# Patient Record
Sex: MALE | Race: WHITE | HISPANIC OR LATINO | Employment: FULL TIME | URBAN - METROPOLITAN AREA
[De-identification: names, ages, dates, MRNs, and addresses within clinical notes are randomized per-mention and may not be internally consistent; named-entity substitution may affect disease eponyms.]

---

## 2017-08-27 ENCOUNTER — HOSPITAL ENCOUNTER (EMERGENCY)
Facility: CLINIC | Age: 32
Discharge: HOME | End: 2017-08-27
Attending: EMERGENCY MEDICINE | Admitting: EMERGENCY MEDICINE

## 2017-08-27 VITALS
SYSTOLIC BLOOD PRESSURE: 151 MMHG | DIASTOLIC BLOOD PRESSURE: 112 MMHG | OXYGEN SATURATION: 99 % | TEMPERATURE: 98.2 F | RESPIRATION RATE: 16 BRPM | HEART RATE: 83 BPM

## 2017-08-27 DIAGNOSIS — K04.7 DENTAL ABSCESS: ICD-10-CM

## 2017-08-27 PROCEDURE — 25000132 ZZH RX MED GY IP 250 OP 250 PS 637: Performed by: EMERGENCY MEDICINE

## 2017-08-27 PROCEDURE — 99283 EMERGENCY DEPT VISIT LOW MDM: CPT

## 2017-08-27 RX ORDER — OXYCODONE AND ACETAMINOPHEN 5; 325 MG/1; MG/1
2 TABLET ORAL ONCE
Status: COMPLETED | OUTPATIENT
Start: 2017-08-27 | End: 2017-08-27

## 2017-08-27 RX ORDER — PENICILLIN V POTASSIUM 500 MG/1
500 TABLET, FILM COATED ORAL 4 TIMES DAILY
Qty: 28 TABLET | Refills: 0 | Status: SHIPPED | OUTPATIENT
Start: 2017-08-27 | End: 2017-09-03

## 2017-08-27 RX ORDER — HYDROCODONE BITARTRATE AND ACETAMINOPHEN 5; 325 MG/1; MG/1
1-2 TABLET ORAL EVERY 4 HOURS PRN
Qty: 10 TABLET | Refills: 0 | Status: SHIPPED | OUTPATIENT
Start: 2017-08-27 | End: 2018-05-30

## 2017-08-27 RX ADMIN — OXYCODONE HYDROCHLORIDE AND ACETAMINOPHEN 2 TABLET: 5; 325 TABLET ORAL at 14:04

## 2017-08-27 ASSESSMENT — ENCOUNTER SYMPTOMS
FEVER: 0
FACIAL SWELLING: 1

## 2017-08-27 NOTE — ED AVS SNAPSHOT
[facility]    [address]    [facility] [address]    Phone:  [phone]    Fax:  [phone]                                       Ayan Camacho   MRN: [id number]    Department:  [facility]   Date of Visit:  8/27/2017           After Visit Summary Signature Page     I have received my discharge instructions, and my questions have been answered. I have discussed any challenges I see with this plan with the nurse or doctor.    ..........................................................................................................................................  Patient/Patient Representative Signature      ..........................................................................................................................................  Patient Representative Print Name and Relationship to Patient    ..................................................               ................................................  Date                                            Time    ..........................................................................................................................................  Reviewed by Signature/Title    ...................................................              ..............................................  Date                                                            Time

## 2017-08-27 NOTE — ED AVS SNAPSHOT
[facility]    [address]    [facility] [address]    Phone:  [phone]    Fax:  [phone]                                       Ayan Camacho   MRN: [id number]    Department:  [facility]   Date of Visit:  8/27/2017           Patient Information     Date Of Birth          1985        Your diagnoses for this visit were:     Dental abscess        You were seen by Ame Larson MD.      Follow-up Information     Follow up with Dentist  Today.    Why:  ASAP        Follow up with [facility].    Specialty:  EMERGENCY MEDICINE    Why:  As needed    Contact information:    [address]  [facility] [address] [phone]        Discharge Instructions         Discharge Instructions  Dental Pain    You have been seen today for a toothache. Your pain may be caused by an exposed nerve, an infection (pulpitis), a root abscess, or other problems. You will need to see a dentist for a solution to your tooth problem. Emergency Department care is only to help control your problem until you can see a dentist.  Today, we did not find any sign that your toothache was caused by a serious condition, but sometimes symptoms develop over time and cannot be found during an emergency visit, so it is very important that you follow up with your dentist.      Return to the Emergency Department if:    You develop a fever over 101 degrees Fahrenheit    You can t open your mouth normally, can t move your tongue well, or can t swallow    You have new or increased swelling of your face or neck.    You develop drainage of pus or foul smelling material from around your tooth.  What can I do to help myself?    Take any antibiotic the doctor may have prescribed for you today.    Avoid very hot or very cold foods as both can cause pain.    Make an appointment to see a dentist as soon as possible. If  you wish, we can provide you with a list of low-cost dental clinics.       Remember that you can always come back to the Emergency Department if you are not able to see your regular doctor in the amount of time listed above, if you get any new symptoms, or if there is anything that worries you.        Dental Resources  Name/Address/Phone Eligibility Hours Fee   [facility]  [address]  [place] [address]  [phone] Anyone Call for appointment [facility]  Medical Assistance  Private Insurance   [facility]  [address] [place] [address]  [phone] Anyone Call for appointment    [facility] refers to low-cost dental clinics for non-preventive care    Hungarian Interpreters available Prices start at   Adults        Cleaning $36-$160        X-Ray $20-40  Children        Cleaning $15        X-ray $10-20        Fluoride $10  Accepts cash, check or credit;  Does not take insurance or MA.   [facility]  [address] [place]  [address]  [phone] Anyone Afternoons and evenings    September-May    Answers phones after 10 AM $30.00 per visit   ($15.00 per visit if 62 or older)   Preventive care.  Restoration care; sliding fee; MA   [facility]  [address] [place] [address] [phone] Children birth to age 18 and pregnant women    [facility] Residents without insurance will be asked to apply for Assured Care. M TH F 8:30 am - 5 pm  T W 8:30 am - 7 pm    30 locations metro wide    Call for appointment and to confirm hours. Sliding Fee  [facility]  Medical Assistance  Assured Access  Private Insurance    8 Languages Spoken   [facility] [address] [address]  [phone] Anyone Call for appointment Sliding Fee  Accept insurance, [place],   [place] and self-pay.  Call if no insurance.    All services provided.  Staff fluent in Hmong, Laotian, Nauruan,  Bangladeshi, Greek, Sierra Leonean, and Farsi.   [facility]  [address] [place] [address]  [phone] Children  Adults in a walk in basis Mon - Fri. 8 - 5 pm       (closed 1-2 pm)  General Dentists & Hygienists  Private Dentists  Dentures Fees based on family size and income ranging from 40% - 100% payment by patient.   [facility]  [address] [place]  [address]    [phone] Anyone Mon - Fri 7:30 am - 5:00 pm  By Appt.    Tues & Wed @ 3:00 call for urgent care Appt for next day service Sliding fee:  MA; Insurance   [facility]  [address] [place], [place]  [address] [phone] Anyone Call for an appointment.  Days open vary every semester. Adult cleaning $25  Child cleaning $15  X-Rays $10-$15  Whitening services available  $75, includes cleaning  Seniors 50% off   [facility]  [address] [place]  [address]  [phone] Anyone M-F 8 am - 5 pm Most insurances accepted.  MA and Sliding Scale.   [facility]  [address] [place] [address]  [phone] Anyone without insurance Call to make appt   M-F 9:00 am - 5 pm   (Closed noon hour 12-1)    6 pm- 8 pm Evening hours also available for care Sliding fee based on income and size of household.   [facility]  [address] [place] [address]  [address]  [phone] press option 1    For the [facility] press option 4 Anyone              Anyone Monday  4 - 6:30 pm  Tuesday 12:30 - 3 & 4-6:30  Thursday 8:30 - 11 am & 12-2:30 pm  September through May only    All year around on Thursdays from 5-9 pm (only time a dentist is in.) Cleanings & X-Rays Only  Cleanings:  Adults $30                   Kids $20  X-Rays:  Adults $34                Kids $10    MA and Sliding fee   [address]  [address]    [phone] Anyone    (Hmong interpreters available) M-F 8:00 am - 5:00 pm       By appointment only  (same day appointments available) Sliding fee ($40+ may be due at appointment, remainder billed); MA; Insurance   [facility]  [address] [place] [address]    [phone]   Anyone    (Hmong interpreters available) M-Th 8:00 am - 8:00 pm  F 8:00 am - 5:00 pm    By appointment only  (same day appointments available) Sliding fee ($40+ may be due at appointment, remainder billed); MA; Insurance   [facility]  [address] [place]  [address] [phone] Anyone    Must live within [facility] to qualify for sliding fee services Mon, Tues, Thurs, Fri  8:30 am - 5:00 pm  Wed. 8:30 am - 7:00 pm  All other services by appt. only Sliding Fee; MA   Offer payment plans    Have financial workers that will assist with [place] and will use sliding fee for those who do not qualify.      [facility]  [address] [place] [phone] Anyone without insurance     Hours and day of week vary  (Call ahead for hours)    Walk-in only Free Services    Cleanings; Fillings; Extractions   [facility]  [address] [place] [address]  [phone] Anyone Call for an appointment Accept patients with [place] and Medical Assistance.  10% discount if bill is paid in full on day of service.  No sliding fee scale.     [facility]  [address] [place], [place] [phone] Anyone M-F  8 am-5 pm  Call for appt.    Walk-in hours 8 am - 11am and 1 pm - 5 pm, however take scheduled appointments first    No emergency services or oral surgeries Sliding Fee available with an [place] or [place] denial letter and proof of income.    Accepts Assured Access card and [place] coverage.     Name/Address/Phone Eligibility Hours Fee   [address] [place]  [address]  [phone] Anyone with emergencies only M & W clinic begins at 6 pm   Call ahead    Alternate Fridays for children by Appt only Free   [facility]  [address] [place] [address]  [phone]    Emergencies (adults only):  [phone] Anyone Free walk-in screens for oral surgery    Call ahead for hours    All other services by appt. only  Accepts MA for pediatrics only    Rates are about 25% - 40% less than private dental office.    No sliding fee scale   [facility]  [address] [place] [address]  [address]  [phone] Anyone as long as they do not have health insurance Hours on Mondays, Tuesdays, and Thursdays Sliding fees based on monthly income    No root canals, tooth pulls or emergencies   [facility]  [address] [place] [address]  [phone] Anyone  M - F 8:00 am - 5:00 pm  Wed 8:00 am - 8 pm Sliding fees; MA; Insurance   [facility]  [address] [place] [address]  [address]  [phone] Anyone age 55+ M - F 8:00 am - 4:30 pm    Appt. only Set slightly lower fees;  MA; Insurance         Give Kids a smile day in [facility] Children Takes place in February at a few locations                          Dental Pain:      Dear Emergency Department Patient:     Here at [facility], we are always pleased to evaluate you for emergency conditions and offer a screening examination. Today, we have seen you and determined that you have dental pain and/or a dental problem.  We do not have the equipment and/or advanced training to perform definitive dental care, therefore, you need to be seen by a dentist for further care.     You may see your regular dentist if you have one, or we have attached a list of community dental providers, including some who provide care at a reduced fee.      Please be aware that if a narcotic medication was prescribed, it will not be refilled in the emergency  department.  Accordingly, if you should have ongoing problems and/or pain, you should contact a dentist right away for definitive treatment.    Sincerely,        [facility])        Opioid Medication Discharge Instructions    You have been given a prescription for an opioid (narcotic) pain medicine and/or have   received a pain medicine while here in the emergency department. These medicines can make you drowsy or impaired.     You must not drive, operate dangerous equipment, or   engage in any other dangerous activities while taking these medications. If you drive while taking these medications, you could be arrested for DUI, or driving under the   influence. Do not drink any alcohol while you are taking these medications.     Opioid pain medications can cause addiction. If you have a history of chemical   dependency of any type, you are at a higher risk of becoming addicted to pain   medications. Only take these prescribed medications to treat your pain when all other   options have been tried. Take it for as short a time and as few doses as possible.     Store your pain pills in a secure place, as they are frequently stolen and provide a dangerous opportunity for children or visitors in your house to start abusing these powerful medications. We will not replace any lost or stolen medicine.     As soon as your pain is better, you should safely dispose of all your remaining medication.     Many prescription pain medications contain Tylenol (acetaminophen), including Vicodin, Tylenol #3, Norco, Lortab, and Percocet. You should not take any extra pills of Tylenol if you are using these prescription medications or you can get very sick. Do not ever take more than 4000 mg of acetaminophen in any 24 hour period.    All opioids tend to cause constipation. Drink plenty of water and eat foods that have   a lot of fiber, such as fruits, vegetables, prune juice, apple juice  and high fiber cereal.   Take a laxative if you don t move your bowels at least every other day. Miralax, Milk of   Magnesia, Colace, or Senna can be used to keep you regular.          24 Hour Appointment Hotline       To make an appointment at any [facility] clinic, call [phone] ([phone]). If you don't have a family doctor or clinic, we will help you find one. [facility] clinics are conveniently located to serve the needs of you and your family.             Review of your medicines      START taking        Dose / Directions Last dose taken    penicillin V potassium 500 MG tablet   Commonly known as:  VEETID   Dose:  500 mg   Quantity:  28 tablet        Take 1 tablet (500 mg) by mouth 4 times daily for 7 days   Refills:  0          Our records show that you are taking the medicines listed below. If these are incorrect, please call your family doctor or clinic.        Dose / Directions Last dose taken    NO ACTIVE MEDICATIONS        Refills:  0        oxyCODONE-acetaminophen 5-325 MG per tablet   Commonly known as:  PERCOCET   Dose:  1 tablet   Quantity:  10 tablet        Take 1 tablet by mouth every 6 hours as needed for pain   Refills:  0          ASK your doctor about these medications        Dose / Directions Last dose taken    * HYDROcodone-acetaminophen 5-325 MG per tablet   Commonly known as:  NORCO   Dose:  1-2 tablet   What changed:  Another medication with the same name was added. Make sure you understand how and when to take each.   Quantity:  15 tablet   Ask about: Which instructions should I use?        Take 1-2 tablets by mouth every 4 hours as needed for pain.   Refills:  0        * HYDROcodone-acetaminophen 5-325 MG per tablet   Commonly known as:  NORCO   Dose:  1-2 tablet   What changed:  You were already taking a medication with the same name, and this prescription was added. Make sure you understand how and when to take each.   Quantity:  10 tablet   Ask about: Which instructions should  I use?        Take 1-2 tablets by mouth every 4 hours as needed for moderate to severe pain   Refills:  0        * Notice:  This list has 2 medication(s) that are the same as other medications prescribed for you. Read the directions carefully, and ask your doctor or other care provider to review them with you.            Prescriptions were sent or printed at these locations (2 Prescriptions)                   Other Prescriptions                Printed at Department/Unit printer (2 of 2)         penicillin V potassium (VEETID) 500 MG tablet               HYDROcodone-acetaminophen (NORCO) 5-325 MG per tablet                Orders Needing Specimen Collection     None      Pending Results     No orders found from 8/25/2017 to 8/28/2017.            Pending Culture Results     No orders found from 8/25/2017 to 8/28/2017.            Pending Results Instructions     If you had any lab results that were not finalized at the time of your Discharge, you can call the ED Lab Result RN at [phone]. You will be contacted by this team for any positive Lab results or changes in treatment. The nurses are available 7 days a week from 10A to 6:30P.  You can leave a message 24 hours per day and they will return your call.        Test Results From Your Hospital Stay               Clinical Quality Measure: Blood Pressure Screening     Your blood pressure was checked while you were in the emergency department today. The last reading we obtained was  BP: (!) 147/111 . Please read the guidelines below about what these numbers mean and what you should do about them.  If your systolic blood pressure (the top number) is less than 120 and your diastolic blood pressure (the bottom number) is less than 80, then your blood pressure is normal. There is nothing more that you need to do about it.  If your systolic blood pressure (the top number) is 120-139 or your diastolic blood pressure (the bottom number) is 80-89, your blood pressure may be  "higher than it should be. You should have your blood pressure rechecked within a year by a primary care provider.  If your systolic blood pressure (the top number) is 140 or greater or your diastolic blood pressure (the bottom number) is 90 or greater, you may have high blood pressure. High blood pressure is treatable, but if left untreated over time it can put you at risk for heart attack, stroke, or kidney failure. You should have your blood pressure rechecked by a primary care provider within the next 4 weeks.  If your provider in the emergency department today gave you specific instructions to follow-up with your doctor or provider even sooner than that, you should follow that instruction and not wait for up to 4 weeks for your follow-up visit.        Thank you for choosing [facility]       Thank you for choosing [facility] for your care. Our goal is always to provide you with excellent care. Hearing back from our patients is one way we can continue to improve our services. Please take a few minutes to complete the written survey that you may receive in the mail after you visit with us. Thank you!        [employer] Information     [employer] lets you send messages to your doctor, view your test results, renew your prescriptions, schedule appointments and more. To sign up, go to [email or web address] . Click on \"Log in\" on the left side of the screen, which will take you to the Welcome page. Then click on \"Sign up Now\" on the right side of the page.     You will be asked to enter the access code listed below, as well as some personal information. Please follow the directions to create your username and password.     Your access code is: [email or web address]  Expires: 2017  1:36 PM     Your access code will  in 90 days. If you need help or a new code, please call your [facility] clinic or [phone].        Care EveryWhere ID     This is your Care EveryWhere ID. This could be used by other organizations to access your " [place] medical records  [id number]        Equal Access to Services     GOKUL DUPREE : Hadii georgi Strickland, gerald menchaca, richie paul, edu flowers. So [facility] [phone].    ATENCIÓN: Si habla español, tiene a sanchez disposición servicios gratuitos de asistencia lingüística. Llame al [phone].    We comply with applicable federal civil rights laws and [place] laws. We do not discriminate on the basis of race, color, national origin, age, disability sex, sexual orientation or gender identity.            After Visit Summary       This is your record. Keep this with you and show to your community pharmacist(s) and doctor(s) at your next visit.

## 2017-08-27 NOTE — DISCHARGE INSTRUCTIONS
Discharge Instructions  Dental Pain    You have been seen today for a toothache. Your pain may be caused by an exposed nerve, an infection (pulpitis), a root abscess, or other problems. You will need to see a dentist for a solution to your tooth problem. Emergency Department care is only to help control your problem until you can see a dentist.  Today, we did not find any sign that your toothache was caused by a serious condition, but sometimes symptoms develop over time and cannot be found during an emergency visit, so it is very important that you follow up with your dentist.      Return to the Emergency Department if:    You develop a fever over 101 degrees Fahrenheit    You can t open your mouth normally, can t move your tongue well, or can t swallow    You have new or increased swelling of your face or neck.    You develop drainage of pus or foul smelling material from around your tooth.  What can I do to help myself?    Take any antibiotic the doctor may have prescribed for you today.    Avoid very hot or very cold foods as both can cause pain.    Make an appointment to see a dentist as soon as possible. If you wish, we can provide you with a list of low-cost dental clinics.       Remember that you can always come back to the Emergency Department if you are not able to see your regular doctor in the amount of time listed above, if you get any new symptoms, or if there is anything that worries you.        Dental Resources  Name/Address/Phone Eligibility Hours Fee   [facility]  [address], [address]  [place] [address]  [phone] Anyone Call for appointment [facility]  [facility]  Private Insurance   [facility]  Hygiene Clinic  [facility] [place], [place] [id number] [phone] Anyone Call for appointment    [facility] refers to low-cost dental clinics for non-preventive care    [employer] Interpreters available Prices start at   Adults        Cleaning $36-$160         X-Ray $20-40  Children        Cleaning $15        X-ray $10-20        Fluoride $10  Accepts cash, check or credit;  Does not take insurance or MA.   [facility]  [address] [place], [place]  [id number] [phone] Anyone Afternoons and evenings    September-May    Answers phones after 10 AM $30.00 per visit   ($15.00 per visit if 62 or older)   Preventive care.  Restoration care; sliding fee; [place]   [facility]  [address] [place] [address] [phone] Children birth to age 18 and pregnant women    [facility] Residents without insurance will be asked to apply for Assured Care. M TH F 8:30 am - 5 pm  T W 8:30 am - 7 pm    30 locations metro wide    Call for appointment and to confirm hours. Sliding Fee  [facility]  Medical Assistance  Assured Access  Private Insurance    8 Languages Spoken   [facility] [address] [address]  [phone] Anyone Call for appointment Sliding Fee  Accept insurance, [place],   [place] and self-pay.  Call if no insurance.    All services provided.  Staff fluent in Hmong, Laotian, Tajik, Korean, Turkish, Slovak, and Farsi.   [facility]  [address] [place], [place] [phone] Children  Adults in a walk in basis Mon - Fri. 8 - 5 pm       (closed 1-2 pm)  General Dentists & Hygienists  Private Dentists  Dentures Fees based on family size and income ranging from 40% - 100% payment by patient.   [facility]  [address] [place], [place]  [id number] [phone] Anyone Mon - Fri 7:30 am - 5:00 pm  By Appt.    Tues & Wed @ 3:00 call for urgent care Appt for next day service Sliding fee:  MA; Insurance   [facility]  [address] [place], [place]  [id number] [phone] Anyone Call for an appointment.  Days open vary every semester. Adult cleaning $25  Child cleaning $15  X-Rays  $10-$15  Whitening services available  $75, includes cleaning  Seniors 50% off   [facility]  [address] [place], [place]  [id number] [phone] Anyone M-F 8 am - 5 pm Most insurances accepted.  MA and Sliding Scale.   [facility]  [facility] [place], [place]  [id number] [phone] Anyone without insurance Call to make appt   M-F 9:00 am - 5 pm   (Closed noon hour 12-1)    6 pm- 8 pm Evening hours also available for care Sliding fee based on income and size of household.   [facility] Clinic  [address] [place]  [address]  [phone] press option 1    For the [facility] press option 4 Anyone              Anyone Monday  4 - 6:30 pm  Tuesday 12:30 - 3 & 4-6:30  Thursday 8:30 - 11 am & 12-2:30 pm  September through May only    All year around on Thursdays from 5-9 pm (only time a dentist is in.) Cleanings & X-Rays Only  Cleanings:  Adults $30                   Kids $20  X-Rays:  Adults $34                Kids $10    MA and Sliding fee   [facility]  [address] [place], [place] [phone] Anyone    ([employer] interpreters available) M-F 8:00 am - 5:00 pm       By appointment only  (same day appointments available) Sliding fee ($40+ may be due at appointment, remainder billed); MA; Insurance   [facility]  [address] [place], [place] [phone]   Anyone    ([employer] interpreters available) M-Th 8:00 am - 8:00 pm  F 8:00 am - 5:00 pm    By appointment only  (same day appointments available) Sliding fee ($40+ may be due at appointment, remainder billed); MA; Insurance   [facility]  [address] [place], [place]  [phone] Anyone    Must live within [facility] to qualify for sliding fee services Mon, Tues, Thurs, Fri  8:30 am - 5:00 pm  Wed. 8:30 am - 7:00 pm  All other services by  appt. only Sliding Fee; MA   Offer payment plans    Have financial workers that will assist with [place] and will use sliding fee for those who do not qualify.      [facility]  [address] [place] [address]  [phone] Anyone without insurance     Hours and day of week vary  (Call ahead for hours)    Walk-in only Free Services    Cleanings; Fillings; Extractions   [address]  [address] [phone] Anyone Call for an appointment Accept patients with [place] and Medical Assistance.  10% discount if bill is paid in full on day of service.  No sliding fee scale.     [facility]  [address] [place], [place] [phone] Anyone M-F  8 am-5 pm  Call for appt.    Walk-in hours 8 am - 11am and 1 pm - 5 pm, however take scheduled appointments first    No emergency services or oral surgeries Sliding Fee available with an [place] or [place] denial letter and proof of income.    Accepts Assured Access card and [place] coverage.     Name/Address/Phone Eligibility Hours Fee   [facility]  [address] [place], [place]  [phone] Anyone with emergencies only M & W clinic begins at 6 pm   Call ahead    Alternate Fridays for children by Appt only Free   [facility]  [address] [place] [address]  [phone]    Emergencies (adults only):  [phone] Anyone Free walk-in screens for oral surgery    Call ahead for hours    All other services by appt. only  Accepts MA for pediatrics only    Rates are about 25% - 40% less than private dental office.    No sliding fee scale   [facility]  [address] [place], [place]  [phone] Anyone as long as they do not have health insurance Hours on Mondays, Tuesdays, and Thursdays Sliding fees based on monthly income    No root canals, tooth pulls or emergencies   [facility]  [address] [facility] [address] [phone] Anyone  M - F 8:00 am - 5:00 pm  Wed 8:00 am - 8 pm Sliding fees; [place]; Insurance   [facility]  [address] [place]  [address]  [phone] Anyone age 55+ M - F 8:00 am - 4:30 pm    Appt. only Set slightly lower fees;  MA; Insurance         Give Kids a smile day in [facility] Children Takes place in February at a few locations                          Dental Pain:      Dear Emergency Department Patient:     Here at [facility], we are always pleased to evaluate you for emergency conditions and offer a screening examination. Today, we have seen you and determined that you have dental pain and/or a dental problem.  We do not have the equipment and/or advanced training to perform definitive dental care, therefore, you need to be seen by a dentist for further care.     You may see your regular dentist if you have one, or we have attached a list of community dental providers, including some who provide care at a reduced fee.      Please be aware that if a narcotic medication was prescribed, it will not be refilled in the emergency department.  Accordingly, if you should have ongoing problems and/or pain, you should contact a dentist right away for definitive treatment.    Sincerely,        [facility]        Opioid Medication Discharge Instructions    You have been given a prescription for an opioid (narcotic) pain medicine and/or have   received a pain medicine while here in the emergency department. These medicines can make you drowsy or impaired.     You must not drive, operate dangerous equipment, or   engage in any other dangerous activities while taking these medications. If you drive while taking these medications, you could be arrested for DUI, or driving under the   influence. Do not drink any alcohol while you are taking these medications.     Opioid pain  medications can cause addiction. If you have a history of chemical   dependency of any type, you are at a higher risk of becoming addicted to pain   medications. Only take these prescribed medications to treat your pain when all other   options have been tried. Take it for as short a time and as few doses as possible.     Store your pain pills in a secure place, as they are frequently stolen and provide a dangerous opportunity for children or visitors in your house to start abusing these powerful medications. We will not replace any lost or stolen medicine.     As soon as your pain is better, you should safely dispose of all your remaining medication.     Many prescription pain medications contain Tylenol (acetaminophen), including Vicodin, Tylenol #3, Norco, Lortab, and Percocet. You should not take any extra pills of Tylenol if you are using these prescription medications or you can get very sick. Do not ever take more than 4000 mg of acetaminophen in any 24 hour period.    All opioids tend to cause constipation. Drink plenty of water and eat foods that have   a lot of fiber, such as fruits, vegetables, prune juice, apple juice and high fiber cereal.   Take a laxative if you don t move your bowels at least every other day. Miralax, Milk of   Magnesia, Colace, or Senna can be used to keep you regular.

## 2017-08-27 NOTE — ED PROVIDER NOTES
"  History     Chief Complaint:  Jaw Pain    HPI   Ayan Camacho is a 32 year old male who presents to the emergency department today for evaluation of right jaw pain and swelling. The patient states he has generally \"bad teeth\" and is concerned for a possible abscess to where his pain is localized. He denies any fever. Of note, the patient reports a lipoma to the back of his neck which is not new and he's been evaluated for previously.     Allergies:  Ibuprofen      Medications:    oxyCODONE-acetaminophen (PERCOCET) 5-325 MG per tablet  HYDROcodone-acetaminophen 5-325 MG per tablet    Past Medical History:    History reviewed. No pertinent past medical history.     Past Surgical History:    Orthopedic surgery     Family History:    History reviewed. No pertinent family history.      Social History:  The patient was accompanied to the ED by his significant other.  Smoking Status: Current every day smoker   Alcohol Use: Yes    Marital Status:  Single      Review of Systems   Constitutional: Negative for fever.   HENT: Positive for dental problem and facial swelling.    All other systems reviewed and are negative.    Physical Exam     Patient Vitals for the past 24 hrs:   BP Temp Temp src Pulse Resp SpO2   08/27/17 1323 (!) 147/111 - - - - -   08/27/17 1322 - 98.2  F (36.8  C) Oral 83 16 96 %      Physical Exam  Nursing note and vitals reviewed.    Constitutional: Pleasant. Poor dentition with severe decay diffusely.          HENT:    Mouth/Throat: Oropharynx is without swelling or erythema. Oral mucosa moist.  Fullness along gingival fold adjacent to right lower molars. No fluctuance.  Floor of mouth is soft. No trismus.    Eyes: Conjunctivae normal are normal. No scleral icterus.    Neck: Neck supple. Lipoma to right posterior neck. No cervical adenopathy.   Cardiovascular: Regular rate and rhythm without murmur. Peripheral pulse with normal rate, regular rhythm. Intact distal pulses.   Pulmonary/Chest: Effort " normal    Neurological:Alert. Coordination normal.   Skin: Skin is warm and dry.   Psychiatric: Normal mood and affect.      Emergency Department Course     Emergency Department Course:  Nursing notes and vitals reviewed.  1326 I performed an exam of the patient as documented above.  1334 I updated the patient for discharge. I discussed the treatment plan with the patient. They expressed understanding of this plan and consented to discharge. They will be discharged home with instructions for care and follow up. In addition, the patient will return to the emergency department if their symptoms persist, worsen, if new symptoms arise or if there is any concern.  All questions were answered.   Impression & Plan      Medical Decision Making:  The patient presents with mouth/face pain that seems to originate with a tooth.  There is a small amount of swelling but no fluctuance to suggest an abscess that would benefit from  incision and drainage.   The differential diagnosis includes, but is not limited to; pulpitis, periapical abscess, fractured tooth. There is no evidence of buccinator/canine space infections, significant facial swelling, or signs of Raimundo's angina. Likely apical abscess with adjacent cellulitis vs. Early abscess. Patient refused trial of I&D to confirm suspicion.     I have instructed the patient to return to the ED with fever, increased facial swelling, other new or worse symptoms.  I have stressed the importance of  follow up with a dentist/endodontist as soon as possible for definitive management of their dental problem.  The patient was provided with community dental resources. I reviewed his record in the [place]  he has not received narcotic prescriptions. Explained our policy but offered 1 time prescription with standard precautions.     Plan:  1. Return to the ED  with new or worse symptoms  2. Follow up with a dentist for definitive management  3. Provided with standard EPPA Discharge  instructions for Dental Pain  4. Prescriptions for penicillin and norco were provided.     Diagnosis:    ICD-10-CM    1. Dental abscess K04.7      Disposition:  Discharged to home with the below prescription.    Discharge Medications:  New Prescriptions    HYDROCODONE-ACETAMINOPHEN (NORCO) 5-325 MG PER TABLET    Take 1-2 tablets by mouth every 4 hours as needed for moderate to severe pain    PENICILLIN V POTASSIUM (VEETID) 500 MG TABLET    Take 1 tablet (500 mg) by mouth 4 times daily for 7 days      Nacho Boykin  8/27/2017   [facility] EMERGENCY DEPARTMENT  Scribe Disclosure:  I, Nacho Boykin, am serving as a scribe at 1:22 PM on 8/27/2017 to document services personally performed by Ame Larson MD based on my observations and the provider's statements to me.      Small amount of swelling with no fluctuance      Ame Larson MD  08/31/17 2014

## 2017-09-16 ENCOUNTER — HOSPITAL ENCOUNTER (EMERGENCY)
Facility: CLINIC | Age: 32
Discharge: HOME | End: 2017-09-16
Attending: EMERGENCY MEDICINE | Admitting: EMERGENCY MEDICINE

## 2017-09-16 ENCOUNTER — APPOINTMENT (OUTPATIENT)
Dept: CT IMAGING | Facility: CLINIC | Age: 32
End: 2017-09-16
Attending: EMERGENCY MEDICINE

## 2017-09-16 VITALS
RESPIRATION RATE: 16 BRPM | SYSTOLIC BLOOD PRESSURE: 145 MMHG | OXYGEN SATURATION: 99 % | TEMPERATURE: 98.5 F | DIASTOLIC BLOOD PRESSURE: 103 MMHG

## 2017-09-16 DIAGNOSIS — R10.12 ABDOMINAL PAIN, LEFT UPPER QUADRANT: ICD-10-CM

## 2017-09-16 DIAGNOSIS — I15.9 SECONDARY HYPERTENSION: ICD-10-CM

## 2017-09-16 DIAGNOSIS — T67.5XXA HEAT EXHAUSTION, INITIAL ENCOUNTER: ICD-10-CM

## 2017-09-16 LAB
ALBUMIN SERPL-MCNC: 3.6 G/DL (ref 3.4–5)
ALBUMIN UR-MCNC: NEGATIVE MG/DL
ALP SERPL-CCNC: 27 U/L (ref 40–150)
ALT SERPL W P-5'-P-CCNC: 33 U/L (ref 0–70)
ANION GAP SERPL CALCULATED.3IONS-SCNC: 6 MMOL/L (ref 3–14)
APPEARANCE UR: CLEAR
AST SERPL W P-5'-P-CCNC: 22 U/L (ref 0–45)
BASOPHILS # BLD AUTO: 0 10E9/L (ref 0–0.2)
BASOPHILS NFR BLD AUTO: 0 %
BILIRUB SERPL-MCNC: 0.3 MG/DL (ref 0.2–1.3)
BILIRUB UR QL STRIP: NEGATIVE
BUN SERPL-MCNC: 10 MG/DL (ref 7–30)
CALCIUM SERPL-MCNC: 8.9 MG/DL (ref 8.5–10.1)
CHLORIDE SERPL-SCNC: 108 MMOL/L (ref 94–109)
CO2 SERPL-SCNC: 24 MMOL/L (ref 20–32)
COLOR UR AUTO: YELLOW
CREAT SERPL-MCNC: 0.74 MG/DL (ref 0.66–1.25)
DIFFERENTIAL METHOD BLD: ABNORMAL
EOSINOPHIL # BLD AUTO: 0.1 10E9/L (ref 0–0.7)
EOSINOPHIL NFR BLD AUTO: 2.5 %
ERYTHROCYTE [DISTWIDTH] IN BLOOD BY AUTOMATED COUNT: 12.7 % (ref 10–15)
GFR SERPL CREATININE-BSD FRML MDRD: >90 ML/MIN/1.7M2
GLUCOSE SERPL-MCNC: 88 MG/DL (ref 70–99)
GLUCOSE UR STRIP-MCNC: NEGATIVE MG/DL
HCT VFR BLD AUTO: 44.1 % (ref 40–53)
HGB BLD-MCNC: 15.5 G/DL (ref 13.3–17.7)
HGB UR QL STRIP: NEGATIVE
IMM GRANULOCYTES # BLD: 0 10E9/L (ref 0–0.4)
IMM GRANULOCYTES NFR BLD: 0.2 %
KETONES UR STRIP-MCNC: NEGATIVE MG/DL
LEUKOCYTE ESTERASE UR QL STRIP: NEGATIVE
LIPASE SERPL-CCNC: 213 U/L (ref 73–393)
LYMPHOCYTES # BLD AUTO: 1.4 10E9/L (ref 0.8–5.3)
LYMPHOCYTES NFR BLD AUTO: 28.9 %
MCH RBC QN AUTO: 33.1 PG (ref 26.5–33)
MCHC RBC AUTO-ENTMCNC: 35.1 G/DL (ref 31.5–36.5)
MCV RBC AUTO: 94 FL (ref 78–100)
MONOCYTES # BLD AUTO: 0.4 10E9/L (ref 0–1.3)
MONOCYTES NFR BLD AUTO: 8.2 %
MUCOUS THREADS #/AREA URNS LPF: PRESENT /LPF
NEUTROPHILS # BLD AUTO: 2.9 10E9/L (ref 1.6–8.3)
NEUTROPHILS NFR BLD AUTO: 60.2 %
NITRATE UR QL: NEGATIVE
NRBC # BLD AUTO: 0 10*3/UL
NRBC BLD AUTO-RTO: 0 /100
PH UR STRIP: 6 PH (ref 5–7)
PLATELET # BLD AUTO: 179 10E9/L (ref 150–450)
POTASSIUM SERPL-SCNC: 3.8 MMOL/L (ref 3.4–5.3)
PROT SERPL-MCNC: 7 G/DL (ref 6.8–8.8)
RBC # BLD AUTO: 4.68 10E12/L (ref 4.4–5.9)
RBC #/AREA URNS AUTO: 0 /HPF (ref 0–2)
SODIUM SERPL-SCNC: 138 MMOL/L (ref 133–144)
SOURCE: ABNORMAL
SP GR UR STRIP: 1.01 (ref 1–1.03)
UROBILINOGEN UR STRIP-MCNC: 0 MG/DL (ref 0–2)
WBC # BLD AUTO: 4.8 10E9/L (ref 4–11)
WBC #/AREA URNS AUTO: 0 /HPF (ref 0–2)

## 2017-09-16 PROCEDURE — 96361 HYDRATE IV INFUSION ADD-ON: CPT

## 2017-09-16 PROCEDURE — 25000128 H RX IP 250 OP 636: Performed by: EMERGENCY MEDICINE

## 2017-09-16 PROCEDURE — 74176 CT ABD & PELVIS W/O CONTRAST: CPT

## 2017-09-16 PROCEDURE — 80053 COMPREHEN METABOLIC PANEL: CPT | Performed by: EMERGENCY MEDICINE

## 2017-09-16 PROCEDURE — 83690 ASSAY OF LIPASE: CPT | Performed by: EMERGENCY MEDICINE

## 2017-09-16 PROCEDURE — 99284 EMERGENCY DEPT VISIT MOD MDM: CPT | Mod: 25

## 2017-09-16 PROCEDURE — 96360 HYDRATION IV INFUSION INIT: CPT

## 2017-09-16 PROCEDURE — 85025 COMPLETE CBC W/AUTO DIFF WBC: CPT | Performed by: EMERGENCY MEDICINE

## 2017-09-16 PROCEDURE — 81001 URINALYSIS AUTO W/SCOPE: CPT | Performed by: EMERGENCY MEDICINE

## 2017-09-16 RX ORDER — ONDANSETRON 4 MG/1
4 TABLET, ORALLY DISINTEGRATING ORAL EVERY 8 HOURS PRN
Qty: 10 TABLET | Refills: 0 | Status: SHIPPED | OUTPATIENT
Start: 2017-09-16 | End: 2017-09-19

## 2017-09-16 RX ORDER — LIDOCAINE 40 MG/G
CREAM TOPICAL
Status: DISCONTINUED | OUTPATIENT
Start: 2017-09-16 | End: 2017-09-16 | Stop reason: HOSPADM

## 2017-09-16 RX ORDER — SODIUM CHLORIDE 9 MG/ML
1000 INJECTION, SOLUTION INTRAVENOUS CONTINUOUS
Status: DISCONTINUED | OUTPATIENT
Start: 2017-09-16 | End: 2017-09-16 | Stop reason: HOSPADM

## 2017-09-16 RX ADMIN — SODIUM CHLORIDE 1000 ML: 9 INJECTION, SOLUTION INTRAVENOUS at 15:59

## 2017-09-16 RX ADMIN — SODIUM CHLORIDE 1000 ML: 9 INJECTION, SOLUTION INTRAVENOUS at 17:04

## 2017-09-16 ASSESSMENT — ENCOUNTER SYMPTOMS
CHEST TIGHTNESS: 0
FLANK PAIN: 1
FATIGUE: 1
DIARRHEA: 1
ABDOMINAL PAIN: 1
FEVER: 0

## 2017-09-16 NOTE — ED AVS SNAPSHOT
[facility] Emergency Department    [address]    [facility] [address]    Phone:  [phone]    Fax:  [phone]                                       Ayan Camacho   MRN: [id number]    Department:  [facility] Emergency Department   Date of Visit:  9/16/2017           After Visit Summary Signature Page     I have received my discharge instructions, and my questions have been answered. I have discussed any challenges I see with this plan with the nurse or doctor.    ..........................................................................................................................................  Patient/Patient Representative Signature      ..........................................................................................................................................  Patient Representative Print Name and Relationship to Patient    ..................................................               ................................................  Date                                            Time    ..........................................................................................................................................  Reviewed by Signature/Title    ...................................................              ..............................................  Date                                                            Time

## 2017-09-16 NOTE — ED NOTES
"Left sided abdomen pain. Also has felt \"pretty hot\". Notes also muscle spasms. Has also had diarrhea since Thursday night. States slept almost all day yesterday.   "

## 2017-09-16 NOTE — ED PROVIDER NOTES
"  History     Chief Complaint:  Abdominal pain    HPI   Ayan Camacho is a 32 year old male with a history of kidney stones who presents with intermittent left sided abdominal pain with no radiation to his back or groin. He reports an onset 2 days ago. He initially attributed this to laying in the wrong position but does not believe this to be the case now. This pain mostly comes at night and will awaken him. Changing positions help alleviate his symptoms temporarily. Additionally, he has felt \"off\" lately, noting hot feelings and muscle spasms all over his body. He believes this could be due to being dehydrated. 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 He states that he usually is an active person. However, he has felt quite fatigued lately. He took yesterday and today off of work due to his symptoms. He spent almost 14 hours sleeping yesterday. The patient has had \"chunky\" diarrhea since Thursday night. Here in the emergency department, the patient states that his abdomen/flank feel tight but it is not extremely painful.    Allergies:  Ibuprofen sodium     Medications:    Norco  Percocet    Past Medical History:    Kidney stones    Past Surgical History:    Orthopedic surgery    Family History:    The patient denies any relevant family medical history.    Social History:  The patient lays pavement for a living.  Smoking Status: Yes  Alcohol Use: Yes  Marital Status:  Single     Review of Systems   Constitutional: Positive for fatigue. Negative for fever.   Respiratory: Negative for chest tightness.    Cardiovascular: Negative for chest pain.   Gastrointestinal: Positive for abdominal pain and diarrhea.   Genitourinary: Positive for flank pain. Negative for penile pain and testicular pain.   All other systems reviewed and are negative.      Physical Exam   Vitals:  Patient Vitals for the past 24 hrs:   BP Temp Temp src Heart Rate Resp SpO2   09/16/17 1700 (!) 158/112 - - - - " 100 %   09/16/17 1645 (!) 138/107 - - - - 99 %   09/16/17 1630 (!) 141/101 - - - - 99 %   09/16/17 1600 (!) 147/105 - - - - 98 %   09/16/17 1545 (!) 157/120 - - - - -   09/16/17 1530 (!) 150/126 - - - - 98 %   09/16/17 1515 - - - - - 99 %   09/16/17 1446 (!) 167/114 98.5  F (36.9  C) Temporal 91 16 98 %     Physical Exam  Constitutional:  Appears well-developed and well-nourished. Alert. Conversant. Non toxic.  HENT:   Head: Atraumatic.   Nose: Nose normal.  Mouth/Throat: Oral mucosa is clear, perhaps mildly dry. no trismus. Pharynx normal. Tonsils symmetric. No tonsillar enlargement, erythema, or exudate.  Eyes: Conjunctivae normal. EOM normal. Pupils equal, round, and reactive to light. No scleral icterus.   Neck: Normal range of motion. Neck supple. No tracheal deviation present.   Cardiovascular: Normal rate, regular rhythm. No gallop. No friction rub. No murmur heard. Symmetric radial artery pulses   Pulmonary/Chest: Effort normal. No stridor. No respiratory distress. No wheezes. No rales. No rhonchi . No tenderness.   Abdominal: Soft. Bowel sounds normal. No distension. No mass. Mild left mid upper tenderness. No rebound. No guarding. No cva tenderness.  Musculoskeletal: Normal range of motion. No edema. No tenderness. No deformity   Lymph: No cervical adenopathy.   Neurological: Alert and oriented to person, place, and time. Normal strength. CN II-VII intact. No sensory deficit. GCS eye subscore is 4. GCS verbal subscore is 5. GCS motor subscore is 6. Normal coordination   Skin: Skin is warm and dry. No rash noted. No pallor. Normal capillary refill.  Psychiatric:  Normal mood. Normal affect.     Emergency Department Course     Imaging:  CT Abdomen Pelvis w/o Contrast (Stone Protocol)  Unremarkable unenhanced CT of the abdomen and pelvis. Specifically, no urinary tract calculus or evidence of obstruction is demonstrated.  As per radiology.    Laboratory:  UA with micro: mucous present o/w negative   CBC: WBC:  4.8, HGB: 15.5, PLT: 179  CMP: Alkaline Phosphatase 27 (L) o/w WNL (Creatinine: 0.74)  Lipase: 213    Interventions:  15:59 0.9% Sodium Chloride 1,000 mL IV  17:04 0.9% Sodium Chloride 1,000 mL IV    Emergency Department Course:  Nursing notes and vitals reviewed.  I performed an exam of the patient as documented above.     Blood drawn. This was sent to the lab for further testing, results above.  The patient was sent for a CT Abdomen Pelvis while in the emergency department, findings above.   The patient provided a urine sample here in the emergency department. This was sent for laboratory testing, findings above.     IV inserted. Medicine administered as documented above.     17:43 I reevaluated the patient and provided an update in regards to his ED course.      I personally reviewed the laboratory results with the Patient and answered all related questions prior to discharge.   Findings and plan explained to the Patient. Patient discharged home with instructions regarding supportive care, medications, and reasons to return. The importance of close follow-up was reviewed. The patient was prescribed Zofran.      Impression & Plan      Medical Decision Making:  Ayan Camacho is a 32 year old male who came in for evaluation of left sided abdominal pain, generalized weakness, body cramps, fatigue. Symptoms in the going on for the past couple of days. 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 I suspect heat related illness. No evidence for altered mental status is suggest heat stroke. Laboratory workup is reassuring. Kidney functionality flights are well-balanced.    Differential included kidney stone pyelonephritis, but urinalysis and CT are negative. CT is also negative for obstruction, diverticulitis, colitis, intra-abdominal abscess, perforation, other surgical catastrophe. He has no right-sided pain  suggest appendicitis. Other laboratory workup is reassuring.    Feeling somewhat better after IV fluids. He's feeling off to go home. I recommended rest, avoiding exposure. I provided a note for work.    Is also noted to be somewhat hypertensive here in the ER. He's asymptomatic with test. His girlfriend notes that he was hypertensive during his last the doctor visit as well. I suspect he may have undiagnosed primary hypertension. I discussed that he needs to follow up with his PCP and monitor his blood pressure going forward. No symptoms to suggest acute hypertensive emergency at this time. Given that he's in the midst of another illness, will hold off on any new antihypertensives at this time. He will follow up within the next few weeks.    Diagnosis:    ICD-10-CM    1. Heat exhaustion, initial encounter T67.5XXA    2. Abdominal pain, left upper quadrant R10.12        Disposition:   Discharged to home    Discharge Medications:  New Prescriptions    ONDANSETRON (ZOFRAN ODT) 4 MG ODT TAB    Take 1 tablet (4 mg) by mouth every 8 hours as needed for nausea     Scribe Disclosure:  I, Juan Carlos Bah, am serving as a scribe at 3:21 PM on 9/16/2017 to document services personally performed by Stan Hassan, *, based on my observations and the provider's statements to me.    9/16/2017   [facility] EMERGENCY DEPARTMENT       Stan Hassan MD  09/16/17 1800

## 2017-09-16 NOTE — ED AVS SNAPSHOT
[facility] Emergency Department    [address] [place] [address]    Phone:  [phone]    Fax:  [phone]                                       Ayan Camacho   MRN: [id number]    Department:  [facility] Emergency Department   Date of Visit:  9/16/2017           Patient Information     Date Of Birth          1985        Your diagnoses for this visit were:     Heat exhaustion, initial encounter     Abdominal pain, left upper quadrant     Secondary hypertension        You were seen by Manisha Tellez MD and Stan Hassan MD.      Follow-up Information     [facility].    Contact information:    [address]  [address]  [facility] [address] [phone]      Discharge References/Attachments     HEAT EXHAUSTION (ENGLISH)    HYPERTENSION, TO BE CONFIRMED (ENGLISH)    ABDOMINAL PAIN, UNKOWN CAUSE, (MALE) (ENGLISH)      24 Hour Appointment Hotline       To make an appointment at any [facility], call [phone] ([phone]). If you don't have a family doctor or clinic, we will help you find one. Mobile clinics are conveniently located to serve the needs of you and your family.             Review of your medicines      START taking        Dose / Directions Last dose taken    ondansetron 4 MG ODT tab   Commonly known as:  ZOFRAN ODT   Dose:  4 mg   Quantity:  10 tablet        Take 1 tablet (4 mg) by mouth every 8 hours as needed for nausea   Refills:  0          Our records show that you are taking the medicines listed below. If these are incorrect, please call your family doctor or clinic.        Dose / Directions Last dose taken    * HYDROcodone-acetaminophen 5-325 MG per tablet   Commonly known as:  NORCO   Dose:  1-2 tablet   Quantity:  15 tablet        Take 1-2 tablets by mouth every 4 hours as needed for pain.   Refills:  0        * HYDROcodone-acetaminophen 5-325 MG per  tablet   Commonly known as:  NORCO   Dose:  1-2 tablet   Quantity:  10 tablet        Take 1-2 tablets by mouth every 4 hours as needed for moderate to severe pain   Refills:  0        NO ACTIVE MEDICATIONS        Refills:  0        oxyCODONE-acetaminophen 5-325 MG per tablet   Commonly known as:  PERCOCET   Dose:  1 tablet   Quantity:  10 tablet        Take 1 tablet by mouth every 6 hours as needed for pain   Refills:  0        * Notice:  This list has 2 medication(s) that are the same as other medications prescribed for you. Read the directions carefully, and ask your doctor or other care provider to review them with you.            Prescriptions were sent or printed at these locations (1 Prescription)                   Other Prescriptions                Printed at Department/Unit printer (1 of 1)         ondansetron (ZOFRAN ODT) 4 MG ODT tab                Procedures and tests performed during your visit     CBC with platelets differential    CT Abdomen Pelvis w/o Contrast (stone protocol)    Comprehensive metabolic panel    Lipase    Peripheral IV catheter    UA with Microscopic      Orders Needing Specimen Collection     Ordered          09/16/17 1529  Enteric Bacteria and Virus Panel by NIELS Stool - STAT, Prio: STAT, Needs to be Collected     Scheduled Task Status   09/16/17 1530 Collect Enteric Bacteria and Virus Panel by NIELS Stool Open   Order Class:  PCU Collect                  Pending Results     No orders found from 9/14/2017 to 9/17/2017.            Pending Culture Results     No orders found from 9/14/2017 to 9/17/2017.            Pending Results Instructions     If you had any lab results that were not finalized at the time of your Discharge, you can call the ED Lab Result RN at [phone]. You will be contacted by this team for any positive Lab results or changes in treatment. The nurses are available 7 days a week from 10A to 6:30P.  You can leave a message 24 hours per day and they will return your  call.        Test Results From Your Hospital Stay        9/16/2017  3:58 PM      Component Results     Component Value Ref Range & Units Status    WBC 4.8 4.0 - 11.0 10e9/L Final    RBC Count 4.68 4.4 - 5.9 10e12/L Final    Hemoglobin 15.5 13.3 - 17.7 g/dL Final    Hematocrit 44.1 40.0 - 53.0 % Final    MCV 94 78 - 100 fl Final    MCH 33.1 (H) 26.5 - 33.0 pg Final    MCHC 35.1 31.5 - 36.5 g/dL Final    RDW 12.7 10.0 - 15.0 % Final    Platelet Count 179 150 - 450 10e9/L Final    Diff Method Automated Method  Final    % Neutrophils 60.2 % Final    % Lymphocytes 28.9 % Final    % Monocytes 8.2 % Final    % Eosinophils 2.5 % Final    % Basophils 0.0 % Final    % Immature Granulocytes 0.2 % Final    Nucleated RBCs 0 0 /100 Final    Absolute Neutrophil 2.9 1.6 - 8.3 10e9/L Final    Absolute Lymphocytes 1.4 0.8 - 5.3 10e9/L Final    Absolute Monocytes 0.4 0.0 - 1.3 10e9/L Final    Absolute Eosinophils 0.1 0.0 - 0.7 10e9/L Final    Absolute Basophils 0.0 0.0 - 0.2 10e9/L Final    Abs Immature Granulocytes 0.0 0 - 0.4 10e9/L Final    Absolute Nucleated RBC 0.0  Final         9/16/2017  4:20 PM      Component Results     Component Value Ref Range & Units Status    Sodium 138 133 - 144 mmol/L Final    Potassium 3.8 3.4 - 5.3 mmol/L Final    Chloride 108 94 - 109 mmol/L Final    Carbon Dioxide 24 20 - 32 mmol/L Final    Anion Gap 6 3 - 14 mmol/L Final    Glucose 88 70 - 99 mg/dL Final    Urea Nitrogen 10 7 - 30 mg/dL Final    Creatinine 0.74 0.66 - 1.25 mg/dL Final    GFR Estimate >90 >60 mL/min/1.7m2 Final    Non  GFR Calc    GFR Estimate If Black >90 >60 mL/min/1.7m2 Final    African American GFR Calc    Calcium 8.9 8.5 - 10.1 mg/dL Final    Bilirubin Total 0.3 0.2 - 1.3 mg/dL Final    Albumin 3.6 3.4 - 5.0 g/dL Final    Protein Total 7.0 6.8 - 8.8 g/dL Final    Alkaline Phosphatase 27 (L) 40 - 150 U/L Final    ALT 33 0 - 70 U/L Final    AST 22 0 - 45 U/L Final         9/16/2017  4:18 PM      Component Results      Component Value Ref Range & Units Status    Lipase 213 73 - 393 U/L Final         9/16/2017  5:26 PM      Component Results     Component Value Ref Range & Units Status    Color Urine Yellow  Final    Appearance Urine Clear  Final    Glucose Urine Negative NEG^Negative mg/dL Final    Bilirubin Urine Negative NEG^Negative Final    Ketones Urine Negative NEG^Negative mg/dL Final    Specific Gravity Urine 1.015 1.003 - 1.035 Final    Blood Urine Negative NEG^Negative Final    pH Urine 6.0 5.0 - 7.0 pH Final    Protein Albumin Urine Negative NEG^Negative mg/dL Final    Urobilinogen mg/dL 0.0 0.0 - 2.0 mg/dL Final    Nitrite Urine Negative NEG^Negative Final    Leukocyte Esterase Urine Negative NEG^Negative Final    Source Midstream Urine  Final    WBC Urine 0 0 - 2 /HPF Final    RBC Urine 0 0 - 2 /HPF Final    Mucous Urine Present (A) NEG^Negative /LPF Final         9/16/2017  4:52 PM      Narrative     CT ABDOMEN AND PELVIS WITHOUT CONTRAST  9/16/2017 4:12 PM    HISTORY: Left-sided pain. The concern is for a urinary tract calculus.    COMPARISON: None.    TECHNIQUE: Routine transverse CT imaging of the abdomen and pelvis was  performed without contrast. Radiation dose for this scan was reduced  using automated exposure control, adjustment of the mA and/or kV  according to patient size, or iterative reconstruction technique.    FINDINGS: The visualized lung bases are clear. The kidneys, ureters,  and bladder are unremarkable. Specifically, no calculus,  hydronephrosis, or other evidence of obstruction is demonstrated. The  liver, spleen, pancreas, gallbladder, and adrenal glands are normal  allowing for the absence of contrast. No enlarged lymph node or other  abnormal mass is demonstrated. No free fluid is seen. No free  intraperitoneal gas is identified. The gastrointestinal tract is  unremarkable. There is a normal-appearing appendix. No vascular  abnormality is seen. The osseous structures are unremarkable.  No  abdominal or pelvic wall pathology is demonstrated.         Impression     IMPRESSION: Unremarkable unenhanced CT of the abdomen and pelvis.  Specifically, no urinary tract calculus or evidence of obstruction is  demonstrated.    JANET RIVERA MD                Clinical Quality Measure: Blood Pressure Screening     Your blood pressure was checked while you were in the emergency department today. The last reading we obtained was  BP: (!) 158/112 . Please read the guidelines below about what these numbers mean and what you should do about them.  If your systolic blood pressure (the top number) is less than 120 and your diastolic blood pressure (the bottom number) is less than 80, then your blood pressure is normal. There is nothing more that you need to do about it.  If your systolic blood pressure (the top number) is 120-139 or your diastolic blood pressure (the bottom number) is 80-89, your blood pressure may be higher than it should be. You should have your blood pressure rechecked within a year by a primary care provider.  If your systolic blood pressure (the top number) is 140 or greater or your diastolic blood pressure (the bottom number) is 90 or greater, you may have high blood pressure. High blood pressure is treatable, but if left untreated over time it can put you at risk for heart attack, stroke, or kidney failure. You should have your blood pressure rechecked by a primary care provider within the next 4 weeks.  If your provider in the emergency department today gave you specific instructions to follow-up with your doctor or provider even sooner than that, you should follow that instruction and not wait for up to 4 weeks for your follow-up visit.        Thank you for choosing [facility]       Thank you for choosing [facility] for your care. Our goal is always to provide you with excellent care. Hearing back from our patients is one way we can continue to improve our services. Please take a few  "minutes to complete the written survey that you may receive in the mail after you visit with us. Thank you!        [employer] Information     [employer] lets you send messages to your doctor, view your test results, renew your prescriptions, schedule appointments and more. To sign up, go to [email or web address] . Click on \"Log in\" on the left side of the screen, which will take you to the Welcome page. Then click on \"Sign up Now\" on the right side of the page.     You will be asked to enter the access code listed below, as well as some personal information. Please follow the directions to create your username and password.     Your access code is: [email or web address]  Expires: 2017  1:36 PM     Your access code will  in 90 days. If you need help or a new code, please call your [facility] clinic or [phone].        Care EveryWhere ID     This is your Care EveryWhere ID. This could be used by other organizations to access your [facility] medical records  [id number]        Equal Access to Services     [facility] campusGURMEET : Hadii georgi cline Sohung, waaxda luqadaha, qaybta kaalmachuckie paul, edu blackmon . So [facility] [phone].    ATENCIÓN: Si habla español, tiene a sanchez disposición servicios gratuitos de asistencia lingüística. Llame al [phone].    We comply with applicable federal civil rights laws and [place] laws. We do not discriminate on the basis of race, color, national origin, age, disability sex, sexual orientation or gender identity.            After Visit Summary       This is your record. Keep this with you and show to your community pharmacist(s) and doctor(s) at your next visit.                  "

## 2017-09-16 NOTE — LETTER
To Whom it may concern:      Ayan Camacho was seen in our Emergency Department today, 09/16/17.  I expect his condition to improve over the next 1-2 days.  He should be off work yesterday and today, but he may return to work/school when improved.    Sincerely,  Stan Hassan MD

## 2018-05-30 ENCOUNTER — HOSPITAL ENCOUNTER (EMERGENCY)
Facility: CLINIC | Age: 33
Discharge: HOME | End: 2018-05-30
Attending: PHYSICIAN ASSISTANT | Admitting: PHYSICIAN ASSISTANT
Payer: COMMERCIAL

## 2018-05-30 VITALS
TEMPERATURE: 97.8 F | DIASTOLIC BLOOD PRESSURE: 114 MMHG | OXYGEN SATURATION: 99 % | RESPIRATION RATE: 16 BRPM | SYSTOLIC BLOOD PRESSURE: 161 MMHG | HEART RATE: 84 BPM

## 2018-05-30 DIAGNOSIS — R11.2 NAUSEA, VOMITING AND DIARRHEA: ICD-10-CM

## 2018-05-30 DIAGNOSIS — R03.0 ELEVATED BLOOD PRESSURE READING: ICD-10-CM

## 2018-05-30 DIAGNOSIS — R19.7 NAUSEA, VOMITING AND DIARRHEA: ICD-10-CM

## 2018-05-30 LAB
ANION GAP SERPL CALCULATED.3IONS-SCNC: 8 MMOL/L (ref 3–14)
BASOPHILS # BLD AUTO: 0 10E9/L (ref 0–0.2)
BASOPHILS NFR BLD AUTO: 0 %
BUN SERPL-MCNC: 10 MG/DL (ref 7–30)
CALCIUM SERPL-MCNC: 8.8 MG/DL (ref 8.5–10.1)
CHLORIDE SERPL-SCNC: 106 MMOL/L (ref 94–109)
CO2 SERPL-SCNC: 25 MMOL/L (ref 20–32)
CREAT SERPL-MCNC: 0.69 MG/DL (ref 0.66–1.25)
DIFFERENTIAL METHOD BLD: ABNORMAL
EOSINOPHIL # BLD AUTO: 0.1 10E9/L (ref 0–0.7)
EOSINOPHIL NFR BLD AUTO: 2.3 %
ERYTHROCYTE [DISTWIDTH] IN BLOOD BY AUTOMATED COUNT: 12.6 % (ref 10–15)
GFR SERPL CREATININE-BSD FRML MDRD: >90 ML/MIN/1.7M2
GLUCOSE SERPL-MCNC: 104 MG/DL (ref 70–99)
HCT VFR BLD AUTO: 48.6 % (ref 40–53)
HGB BLD-MCNC: 17.3 G/DL (ref 13.3–17.7)
IMM GRANULOCYTES # BLD: 0 10E9/L (ref 0–0.4)
IMM GRANULOCYTES NFR BLD: 0.4 %
LYMPHOCYTES # BLD AUTO: 1.4 10E9/L (ref 0.8–5.3)
LYMPHOCYTES NFR BLD AUTO: 26.9 %
MCH RBC QN AUTO: 33.2 PG (ref 26.5–33)
MCHC RBC AUTO-ENTMCNC: 35.6 G/DL (ref 31.5–36.5)
MCV RBC AUTO: 93 FL (ref 78–100)
MONOCYTES # BLD AUTO: 0.5 10E9/L (ref 0–1.3)
MONOCYTES NFR BLD AUTO: 9.6 %
NEUTROPHILS # BLD AUTO: 3.2 10E9/L (ref 1.6–8.3)
NEUTROPHILS NFR BLD AUTO: 60.8 %
NRBC # BLD AUTO: 0 10*3/UL
NRBC BLD AUTO-RTO: 0 /100
PLATELET # BLD AUTO: 219 10E9/L (ref 150–450)
POTASSIUM SERPL-SCNC: 4 MMOL/L (ref 3.4–5.3)
RBC # BLD AUTO: 5.21 10E12/L (ref 4.4–5.9)
SODIUM SERPL-SCNC: 139 MMOL/L (ref 133–144)
WBC # BLD AUTO: 5.2 10E9/L (ref 4–11)

## 2018-05-30 PROCEDURE — 80048 BASIC METABOLIC PNL TOTAL CA: CPT | Performed by: PHYSICIAN ASSISTANT

## 2018-05-30 PROCEDURE — 96374 THER/PROPH/DIAG INJ IV PUSH: CPT

## 2018-05-30 PROCEDURE — 25000128 H RX IP 250 OP 636: Performed by: PHYSICIAN ASSISTANT

## 2018-05-30 PROCEDURE — 96361 HYDRATE IV INFUSION ADD-ON: CPT

## 2018-05-30 PROCEDURE — 96376 TX/PRO/DX INJ SAME DRUG ADON: CPT

## 2018-05-30 PROCEDURE — 85025 COMPLETE CBC W/AUTO DIFF WBC: CPT | Performed by: PHYSICIAN ASSISTANT

## 2018-05-30 PROCEDURE — 25000132 ZZH RX MED GY IP 250 OP 250 PS 637: Performed by: PHYSICIAN ASSISTANT

## 2018-05-30 PROCEDURE — 99284 EMERGENCY DEPT VISIT MOD MDM: CPT | Mod: 25

## 2018-05-30 RX ORDER — ONDANSETRON 2 MG/ML
4 INJECTION INTRAMUSCULAR; INTRAVENOUS EVERY 30 MIN PRN
Status: DISCONTINUED | OUTPATIENT
Start: 2018-05-30 | End: 2018-05-30 | Stop reason: HOSPADM

## 2018-05-30 RX ORDER — ACETAMINOPHEN 500 MG
1000 TABLET ORAL ONCE
Status: COMPLETED | OUTPATIENT
Start: 2018-05-30 | End: 2018-05-30

## 2018-05-30 RX ORDER — SODIUM CHLORIDE 9 MG/ML
1000 INJECTION, SOLUTION INTRAVENOUS CONTINUOUS
Status: DISCONTINUED | OUTPATIENT
Start: 2018-05-30 | End: 2018-05-30 | Stop reason: HOSPADM

## 2018-05-30 RX ORDER — ONDANSETRON 4 MG/1
4 TABLET, ORALLY DISINTEGRATING ORAL EVERY 8 HOURS PRN
Qty: 10 TABLET | Refills: 0 | Status: SHIPPED | OUTPATIENT
Start: 2018-05-30 | End: 2018-06-02

## 2018-05-30 RX ADMIN — ONDANSETRON 4 MG: 2 INJECTION INTRAMUSCULAR; INTRAVENOUS at 11:46

## 2018-05-30 RX ADMIN — ONDANSETRON 4 MG: 2 INJECTION INTRAMUSCULAR; INTRAVENOUS at 12:19

## 2018-05-30 RX ADMIN — SODIUM CHLORIDE 1000 ML: 9 INJECTION, SOLUTION INTRAVENOUS at 11:47

## 2018-05-30 RX ADMIN — ACETAMINOPHEN 1000 MG: 500 TABLET, FILM COATED ORAL at 12:19

## 2018-05-30 ASSESSMENT — ENCOUNTER SYMPTOMS
ABDOMINAL PAIN: 1
NAUSEA: 1
HEADACHES: 1
DIAPHORESIS: 1
CHILLS: 1
DIARRHEA: 1
ARTHRALGIAS: 1
VOMITING: 1
MYALGIAS: 1

## 2018-05-30 NOTE — DISCHARGE INSTRUCTIONS
Please see primary doctor for recheck of your blood pressure. Also for recheck to make sure you're feeling better.   You can take Ibuprofen 600 mg three times daily and/or Tylenol 500-1000 mg, alternating every 3-4 hours, for pain/fevers/body aches. No more than 4000 mg of Tylenol in 24 hours and no more than 3200 mg Ibuprofen in 24 hours.   Nonspecific Vomiting and Diarrhea (Adult)  Vomiting and diarrhea can have many causes, including:    Helping your body get rid of harmful substances     Gastroenteritis caused by viruses, parasites, bacteria, or toxins.    Allergy to or side effect of a food or medicine    Severe stress or worry (anxiety)     Other illnesses    Pregnancy  It is often hard to pinpoint an exact cause, even with testing. Vomiting and diarrhea often go away within a day or two without problems. If they continue, though, they can lead to too much loss of fluid (dehydration). This can be serious if not treated.    Home care  Medicines    You may use acetaminophen or NSAID medicines like ibuprofen or naproxen to control fever, unless another medicine was prescribed. If you have chronic liver or kidney disease, talk with your healthcare provider before using these medicines. Also talk with your provider if you've had a stomach ulcer or gastrointestinal bleeding. Don't give aspirin to anyone under 18 years of age who is ill with a fever. Don't use NSAID medicines if you are already taking one for another condition (like arthritis) or are on aspirin (such as for heart disease or after a stroke)    If medicines for diarrhea or vomiting were prescribed, take these only as directed. Never take these without a healthcare provider s approval.  General care    If symptoms are severe, rest at home for the next 24 hours, or until you are feeling better.    Washing your hands with soap and water, or using alcohol-based hand  is the best way to stop the spread of infection. Wash your hands after touching  anyone who is sick.    Wash your hands after using the toilet and before meals. Clean the toilet after each use.    Caffeine, tobacco, and alcohol can make the diarrhea, cramping, and pain worse. Remember, caffeine not only is in coffee, but also is in chocolate, some energy drinks, and teas.  Diet    Water and clear liquids are important so you don't get dehydrated. Drink a small amount at a time. Don't guzzle down the drinks. That may increase your nausea, make cramping worse, and cause the drinks to come back up.    Sports drinks may also help. Make sure they are not too sugary, because this can sometimes make things worse. Also, don't drink beverages that are too acidic, like orange juice and grape juice.    If you are very dehydrated, sports drinks aren't a good choice. They have too much sugar and not enough electrolytes. In this case, commercially available products called oral rehydration solutions are best.  Food    Don't force yourself to eat, especially if you have cramps, diarrhea, or vomiting. Eat just a little at a time, and then wait a few minutes before you try to eat more.    Don't eat fatty, greasy, spicy, or fried foods.    Don't eat dairy products if you have diarrhea. They can make it worse.  During the first 24 hours (the first full day), follow the diet below:    Beverages: Sports drinks, soft drinks without caffeine, mineral water, and decaffeinated tea and coffee    Soups: Clear broth, consommé, and bouillon    Desserts: Plain gelatin, popsicles, and fruit juice bars  During the next 24 hours (the second day), you may add the following to the above if you are better. If not, continue what you did the first day:    Hot cereal, plain toast, bread, rolls, crackers    Plain noodles, rice, mashed potatoes, chicken noodle or rice soup    Unsweetened canned fruit (avoid pineapple), bananas    Limit fat intake to less than 15 grams per day by avoiding margarine, butter, oils, mayonnaise, sauces,  gravies, fried foods, peanut butter, meat, poultry, and fish.    Limit fiber. Avoid raw or cooked vegetables, fresh fruits (except bananas) and bran cereals.    Limit caffeine and chocolate. No spices or seasonings except salt.  During the next 24 hours:    Gradually resume a normal diet, as you feel better and your symptoms improve.    If at any time your symptoms start getting worse again, go back to clear liquids until you feel better.  Food preparation    If you have diarrhea, you should not prepare food for others. When preparing foods, wash your hands before and after.    Wash your hands or use alcohol-based  after using cutting boards, countertops, and knives that have been in contact with raw food.    Keep uncooked meats away from cooked and ready-to-eat foods.  Follow-up care  Follow up with your healthcare provider, or as advised. Call if you don't get better in the next 2 to 3 days. If a stool (diarrhea) sample was taken, or cultures done, you will be told if they are positive, or if your treatment needs to be changed. You may call as directed for the results.  If X-rays were taken, and a radiologist has not yet looked at them, he or she will do so. You will be told if there is a change in the reading, especially if it affects your treatment.  Call 911  Call 911 if any of these occur:    Trouble breathing    Chest pain    Confusion    Severe drowsiness or trouble awakening    Fainting or loss of consciousness    Rapid heart rate    Seizure    Stiff neck    Severe weakness, dizziness, or lightheadedness  When to seek medical advice  Call your healthcare provider right away if any of these occur:    Bloody or black vomit or stools    Severe, steady abdominal pain or any abdominal pain that is getting worse    Severe headache or stiff neck    An inability to hold down even sips of liquids for more than 12 hours    Vomiting that lasts more than 24 hours    Diarrhea that lasts more than 24  hours    Fever of 100.4 F (38.0 C) or higher, or as directed by your healthcare provider    Yellowish color to your skin or the whites of your eyes    Signs of dehydration, such as dry mouth, little urine (less than every 6 hours), or very dark urine  Date Last Reviewed: 1/3/2016    9010-1774 [employer]. [address] [place] [address]. All rights reserved. This information is not intended as a substitute for professional medical care. Always follow your healthcare professional's instructions.          Diet for Vomiting or Diarrhea (Adult)    Your symptoms may return or get worse after eating certain foods listed below. If this happens, stop eating these foods until your symptoms ease and you feel better.  Once the vomiting stops, follow the steps below.   During the first 12 to 24 hours  During the first 12 to 24 hours, follow this diet:    Drinks. Plain water, sport drinks like electrolyte solutions, soft drinks without caffeine, mineral water (plain or flavored), clear fruit juices, and decaffeinated tea and coffee.    Soups. Clear broth.    Desserts. Plain gelatin, popsicles, and fruit juice bars. As you feel better, you may add 6 to 8 ounces of yogurt per day. If you have diarrhea, don't have foods or drinks that contain sugar, high-fructose corn syrup, or sugar alcohols.  During the next 24 hours  During the next 24 hours you may add the following to the above:    Hot cereal, plain toast, bread, rolls, and crackers    Plain noodles, rice, mashed potatoes, and chicken noodle or rice soup    Unsweetened canned fruit (but not pineapple) and bananas  Don't eat more than 15 grams of fat a day. Do this by staying away from margarine, butter, oils, mayonnaise, sauces, gravies, fried foods, peanut butter, meat, poultry, and fish.  Don't eat much fiber. Stay away from raw or cooked vegetables, fresh fruits (except bananas), and bran cereals.  Limit how much caffeine and chocolate you have. Do  not use any spices or seasonings except salt.  During the next 24 hours  Slowly go back to your normal diet, as you feel better and your symptoms ease.  Date Last Reviewed: 8/1/2016 2000-2017 [employer]. [address] [place] [address], [place] [address]. All rights reserved. This information is not intended as a substitute for professional medical care. Always follow your healthcare professional's instructions.

## 2018-05-30 NOTE — ED AVS SNAPSHOT
[facility]    [address]    [facility] [address]    Phone:  [phone]    Fax:  [phone]                                       Ayan Camacho   MRN: [id number]    Department:  [facility]   Date of Visit:  5/30/2018           After Visit Summary Signature Page     I have received my discharge instructions, and my questions have been answered. I have discussed any challenges I see with this plan with the nurse or doctor.    ..........................................................................................................................................  Patient/Patient Representative Signature      ..........................................................................................................................................  Patient Representative Print Name and Relationship to Patient    ..................................................               ................................................  Date                                            Time    ..........................................................................................................................................  Reviewed by Signature/Title    ...................................................              ..............................................  Date                                                            Time

## 2018-05-30 NOTE — ED AVS SNAPSHOT
[facility] Emergency Department    [address]    [facility] [address]    Phone:  [phone]    Fax:  [phone]                                       Ayan Camacho   MRN: [id number]    Department:  [facility] Emergency Department   Date of Visit:  5/30/2018           Patient Information     Date Of Birth          1985        Your diagnoses for this visit were:     Nausea, vomiting and diarrhea     Elevated blood pressure reading        You were seen by Velai Cervantes PA-C.      Follow-up Information     Follow up with [facility] In 2 days.    Specialties:  Sports Medicine, Pain Management, Obstetrics & Gynecology, Pediatrics, Internal Medicine, Nephrology    Contact information:    [address]  [facility] [address] [phone]        Follow up with [facility] Emergency Department.    Specialty:  EMERGENCY MEDICINE    Why:  If symptoms worsen    Contact information:    [address]  [facility] [address] [phone]        Discharge Instructions       Please see primary doctor for recheck of your blood pressure. Also for recheck to make sure you're feeling better.   You can take Ibuprofen 600 mg three times daily and/or Tylenol 500-1000 mg, alternating every 3-4 hours, for pain/fevers/body aches. No more than 4000 mg of Tylenol in 24 hours and no more than 3200 mg Ibuprofen in 24 hours.   Nonspecific Vomiting and Diarrhea (Adult)  Vomiting and diarrhea can have many causes, including:    Helping your body get rid of harmful substances     Gastroenteritis caused by viruses, parasites, bacteria, or toxins.    Allergy to or side effect of a food or medicine    Severe stress or worry (anxiety)     Other illnesses    Pregnancy  It is often hard to pinpoint an exact cause, even with testing. Vomiting and diarrhea often go away within a day or two without problems. If they  continue, though, they can lead to too much loss of fluid (dehydration). This can be serious if not treated.    Home care  Medicines    You may use acetaminophen or NSAID medicines like ibuprofen or naproxen to control fever, unless another medicine was prescribed. If you have chronic liver or kidney disease, talk with your healthcare provider before using these medicines. Also talk with your provider if you've had a stomach ulcer or gastrointestinal bleeding. Don't give aspirin to anyone under 18 years of age who is ill with a fever. Don't use NSAID medicines if you are already taking one for another condition (like arthritis) or are on aspirin (such as for heart disease or after a stroke)    If medicines for diarrhea or vomiting were prescribed, take these only as directed. Never take these without a healthcare provider s approval.  General care    If symptoms are severe, rest at home for the next 24 hours, or until you are feeling better.    Washing your hands with soap and water, or using alcohol-based hand  is the best way to stop the spread of infection. Wash your hands after touching anyone who is sick.    Wash your hands after using the toilet and before meals. Clean the toilet after each use.    Caffeine, tobacco, and alcohol can make the diarrhea, cramping, and pain worse. Remember, caffeine not only is in coffee, but also is in chocolate, some energy drinks, and teas.  Diet    Water and clear liquids are important so you don't get dehydrated. Drink a small amount at a time. Don't guzzle down the drinks. That may increase your nausea, make cramping worse, and cause the drinks to come back up.    Sports drinks may also help. Make sure they are not too sugary, because this can sometimes make things worse. Also, don't drink beverages that are too acidic, like orange juice and grape juice.    If you are very dehydrated, sports drinks aren't a good choice. They have too much sugar and not enough  electrolytes. In this case, commercially available products called oral rehydration solutions are best.  Food    Don't force yourself to eat, especially if you have cramps, diarrhea, or vomiting. Eat just a little at a time, and then wait a few minutes before you try to eat more.    Don't eat fatty, greasy, spicy, or fried foods.    Don't eat dairy products if you have diarrhea. They can make it worse.  During the first 24 hours (the first full day), follow the diet below:    Beverages: Sports drinks, soft drinks without caffeine, mineral water, and decaffeinated tea and coffee    Soups: Clear broth, consommé, and bouillon    Desserts: Plain gelatin, popsicles, and fruit juice bars  During the next 24 hours (the second day), you may add the following to the above if you are better. If not, continue what you did the first day:    Hot cereal, plain toast, bread, rolls, crackers    Plain noodles, rice, mashed potatoes, chicken noodle or rice soup    Unsweetened canned fruit (avoid pineapple), bananas    Limit fat intake to less than 15 grams per day by avoiding margarine, butter, oils, mayonnaise, sauces, gravies, fried foods, peanut butter, meat, poultry, and fish.    Limit fiber. Avoid raw or cooked vegetables, fresh fruits (except bananas) and bran cereals.    Limit caffeine and chocolate. No spices or seasonings except salt.  During the next 24 hours:    Gradually resume a normal diet, as you feel better and your symptoms improve.    If at any time your symptoms start getting worse again, go back to clear liquids until you feel better.  Food preparation    If you have diarrhea, you should not prepare food for others. When preparing foods, wash your hands before and after.    Wash your hands or use alcohol-based  after using cutting boards, countertops, and knives that have been in contact with raw food.    Keep uncooked meats away from cooked and ready-to-eat foods.  Follow-up care  Follow up with your  healthcare provider, or as advised. Call if you don't get better in the next 2 to 3 days. If a stool (diarrhea) sample was taken, or cultures done, you will be told if they are positive, or if your treatment needs to be changed. You may call as directed for the results.  If X-rays were taken, and a radiologist has not yet looked at them, he or she will do so. You will be told if there is a change in the reading, especially if it affects your treatment.  Call 911  Call 911 if any of these occur:    Trouble breathing    Chest pain    Confusion    Severe drowsiness or trouble awakening    Fainting or loss of consciousness    Rapid heart rate    Seizure    Stiff neck    Severe weakness, dizziness, or lightheadedness  When to seek medical advice  Call your healthcare provider right away if any of these occur:    Bloody or black vomit or stools    Severe, steady abdominal pain or any abdominal pain that is getting worse    Severe headache or stiff neck    An inability to hold down even sips of liquids for more than 12 hours    Vomiting that lasts more than 24 hours    Diarrhea that lasts more than 24 hours    Fever of 100.4 F (38.0 C) or higher, or as directed by your healthcare provider    Yellowish color to your skin or the whites of your eyes    Signs of dehydration, such as dry mouth, little urine (less than every 6 hours), or very dark urine  Date Last Reviewed: 1/3/2016    8242-2625 [employer]. [address] [place] [address]. All rights reserved. This information is not intended as a substitute for professional medical care. Always follow your healthcare professional's instructions.          Diet for Vomiting or Diarrhea (Adult)    Your symptoms may return or get worse after eating certain foods listed below. If this happens, stop eating these foods until your symptoms ease and you feel better.  Once the vomiting stops, follow the steps below.   During the first 12 to 24 hours  During the  first 12 to 24 hours, follow this diet:    Drinks. Plain water, sport drinks like electrolyte solutions, soft drinks without caffeine, mineral water (plain or flavored), clear fruit juices, and decaffeinated tea and coffee.    Soups. Clear broth.    Desserts. Plain gelatin, popsicles, and fruit juice bars. As you feel better, you may add 6 to 8 ounces of yogurt per day. If you have diarrhea, don't have foods or drinks that contain sugar, high-fructose corn syrup, or sugar alcohols.  During the next 24 hours  During the next 24 hours you may add the following to the above:    Hot cereal, plain toast, bread, rolls, and crackers    Plain noodles, rice, mashed potatoes, and chicken noodle or rice soup    Unsweetened canned fruit (but not pineapple) and bananas  Don't eat more than 15 grams of fat a day. Do this by staying away from margarine, butter, oils, mayonnaise, sauces, gravies, fried foods, peanut butter, meat, poultry, and fish.  Don't eat much fiber. Stay away from raw or cooked vegetables, fresh fruits (except bananas), and bran cereals.  Limit how much caffeine and chocolate you have. Do not use any spices or seasonings except salt.  During the next 24 hours  Slowly go back to your normal diet, as you feel better and your symptoms ease.  Date Last Reviewed: 8/1/2016 2000-2017 [employer]. [address] [place] [address], [place] [address]. All rights reserved. This information is not intended as a substitute for professional medical care. Always follow your healthcare professional's instructions.          Discharge References/Attachments     HIGH BLOOD PRESSURE, YOUR RISK FACTORS (ENGLISH)      24 Hour Appointment Hotline       To make an appointment at any [facility], call [phone] ([phone]). If you don't have a family doctor or clinic, we will help you find one. [facility] are conveniently located to serve the needs of you and your family.             Review of your medicines       START taking        Dose / Directions Last dose taken    ondansetron 4 MG ODT tab   Commonly known as:  ZOFRAN ODT   Dose:  4 mg   Quantity:  10 tablet        Take 1 tablet (4 mg) by mouth every 8 hours as needed   Refills:  0          Our records show that you are taking the medicines listed below. If these are incorrect, please call your family doctor or clinic.        Dose / Directions Last dose taken    NO ACTIVE MEDICATIONS        Refills:  0                Prescriptions were sent or printed at these locations (1 Prescription)                   Other Prescriptions                Printed at Department/Unit printer (1 of 1)         ondansetron (ZOFRAN ODT) 4 MG ODT tab                Procedures and tests performed during your visit     Basic metabolic panel    CBC with platelets differential    Peripheral IV catheter      Orders Needing Specimen Collection     None      Pending Results     No orders found from 5/28/2018 to 5/31/2018.            Pending Culture Results     No orders found from 5/28/2018 to 5/31/2018.            Pending Results Instructions     If you had any lab results that were not finalized at the time of your Discharge, you can call the ED Lab Result RN at [phone]. You will be contacted by this team for any positive Lab results or changes in treatment. The nurses are available 7 days a week from 10A to 6:30P.  You can leave a message 24 hours per day and they will return your call.        Test Results From Your Hospital Stay        5/30/2018 11:47 AM      Component Results     Component Value Ref Range & Units Status    WBC 5.2 4.0 - 11.0 10e9/L Final    RBC Count 5.21 4.4 - 5.9 10e12/L Final    Hemoglobin 17.3 13.3 - 17.7 g/dL Final    Hematocrit 48.6 40.0 - 53.0 % Final    MCV 93 78 - 100 fl Final    MCH 33.2 (H) 26.5 - 33.0 pg Final    MCHC 35.6 31.5 - 36.5 g/dL Final    RDW 12.6 10.0 - 15.0 % Final    Platelet Count 219 150 - 450 10e9/L Final    Diff Method Automated Method   Final    % Neutrophils 60.8 % Final    % Lymphocytes 26.9 % Final    % Monocytes 9.6 % Final    % Eosinophils 2.3 % Final    % Basophils 0.0 % Final    % Immature Granulocytes 0.4 % Final    Nucleated RBCs 0 0 /100 Final    Absolute Neutrophil 3.2 1.6 - 8.3 10e9/L Final    Absolute Lymphocytes 1.4 0.8 - 5.3 10e9/L Final    Absolute Monocytes 0.5 0.0 - 1.3 10e9/L Final    Absolute Eosinophils 0.1 0.0 - 0.7 10e9/L Final    Absolute Basophils 0.0 0.0 - 0.2 10e9/L Final    Abs Immature Granulocytes 0.0 0 - 0.4 10e9/L Final    Absolute Nucleated RBC 0.0  Final         5/30/2018 12:02 PM      Component Results     Component Value Ref Range & Units Status    Sodium 139 133 - 144 mmol/L Final    Potassium 4.0 3.4 - 5.3 mmol/L Final    Chloride 106 94 - 109 mmol/L Final    Carbon Dioxide 25 20 - 32 mmol/L Final    Anion Gap 8 3 - 14 mmol/L Final    Glucose 104 (H) 70 - 99 mg/dL Final    Urea Nitrogen 10 7 - 30 mg/dL Final    Creatinine 0.69 0.66 - 1.25 mg/dL Final    GFR Estimate >90 >60 mL/min/1.7m2 Final    Non  GFR Calc    GFR Estimate If Black >90 >60 mL/min/1.7m2 Final    African American GFR Calc    Calcium 8.8 8.5 - 10.1 mg/dL Final                Clinical Quality Measure: Blood Pressure Screening     Your blood pressure was checked while you were in the emergency department today. The last reading we obtained was  BP: (!) 161/114 . Please read the guidelines below about what these numbers mean and what you should do about them.  If your systolic blood pressure (the top number) is less than 120 and your diastolic blood pressure (the bottom number) is less than 80, then your blood pressure is normal. There is nothing more that you need to do about it.  If your systolic blood pressure (the top number) is 120-139 or your diastolic blood pressure (the bottom number) is 80-89, your blood pressure may be higher than it should be. You should have your blood pressure rechecked within a year by a primary care  "provider.  If your systolic blood pressure (the top number) is 140 or greater or your diastolic blood pressure (the bottom number) is 90 or greater, you may have high blood pressure. High blood pressure is treatable, but if left untreated over time it can put you at risk for heart attack, stroke, or kidney failure. You should have your blood pressure rechecked by a primary care provider within the next 4 weeks.  If your provider in the emergency department today gave you specific instructions to follow-up with your doctor or provider even sooner than that, you should follow that instruction and not wait for up to 4 weeks for your follow-up visit.        Thank you for choosing [facility]       Thank you for choosing [facility] for your care. Our goal is always to provide you with excellent care. Hearing back from our patients is one way we can continue to improve our services. Please take a few minutes to complete the written survey that you may receive in the mail after you visit with us. Thank you!        [employer] Information     [employer] lets you send messages to your doctor, view your test results, renew your prescriptions, schedule appointments and more. To sign up, go to [email or web address] . Click on \"Log in\" on the left side of the screen, which will take you to the Welcome page. Then click on \"Sign up Now\" on the right side of the page.     You will be asked to enter the access code listed below, as well as some personal information. Please follow the directions to create your username and password.     Your access code is: [email or web address]  Expires: 2018 12:51 PM     Your access code will  in 90 days. If you need help or a new code, please call your [facility] clinic or [phone].        Care EveryWhere ID     This is your Care EveryWhere ID. This could be used by other organizations to access your [facility] medical records  [id number]        Equal Access to Services     GOKUL LAMAS: Danita carlson" son Strickland, gerald menchaca, richie paul, edu flowers. So [facility] [phone].    ATENCIÓN: Si habla español, tiene a sanchez disposición servicios gratuitos de asistencia lingüística. Llame al [phone].    We comply with applicable federal civil rights laws and [place] laws. We do not discriminate on the basis of race, color, national origin, age, disability, sex, sexual orientation, or gender identity.            After Visit Summary       This is your record. Keep this with you and show to your community pharmacist(s) and doctor(s) at your next visit.

## 2018-05-30 NOTE — ED PROVIDER NOTES
History     Chief Complaint:  Nausea, Vomiting, & Diarrhea    HPI   Ayan Camacho is an otherwise healthy 32 year old male who presents for evaluation of nausea, non bloody vomiting and non bloody diarrhea over the past 2 days. Associated symptoms include intermittent headaches, left upper quadrant abdominal pain, chills, myalgias, arthralgias, diaphoresis and neo colored urine. The patient states that he has not had any diarrhea episodes since last night as he has not been able to keep any food down. The last time that the patient vomited was this morning, and he believes only stomach acid came up. 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 He states he currently feels very dehydrated. No fevers or other concerns.     Allergies:  Ibuprofen Sodium    Medications:    The patient is not currently taking any prescribed medications.    Past Medical History:    The patient denies any significant past medical history.    Past Surgical History:    Orthopedic Surgery    Family History:    No past pertinent family history.    Social History:  Smoking Status: Current Every Day Smoker   Smokeless Tobacco: Never used  Alcohol Use: Occasional  Marital Status:  Single      Review of Systems   Constitutional: Positive for chills and diaphoresis.   Gastrointestinal: Positive for abdominal pain, diarrhea, nausea and vomiting.   Musculoskeletal: Positive for arthralgias and myalgias.   Neurological: Positive for headaches.   All other systems reviewed and are negative.      Physical Exam     Patient Vitals for the past 24 hrs:   BP Temp Temp src Pulse Heart Rate Resp SpO2   05/30/18 1212 (!) 161/114 - - 84 84 16 99 %   05/30/18 1119 (!) 143/116 97.8  F (36.6  C) Temporal - 103 18 97 %     Physical Exam  Constitutional: Alert, attentive  HENT:    Nose: Nose normal.    Mouth/Throat: Oropharynx is clear, mucous membranes are moist   Eyes: EOM are normal. Pupils are equal, round, and  reactive to light.   CV: regular rate and rhythm  Chest: Effort normal and breath sounds normal.   GI:  There is no tenderness. No distension. Normal bowel sounds  MSK: Normal range of motion.   Neurological: Alert, attentive  Skin: Skin is warm and dry.     Emergency Department Course     Laboratory:  CBC: WBC: 5.2, HGB: 17.3, PLT: 219  BMP: Glucose 104 (H), o/w WNL (Creatinine: 0.69)    Interventions:  1146 Zofran 4 mg IV  1147 0.9% Sodium Chloride 1,000 mL IV  1219 Zofran 4 mg IV   Tylenol 1,000 mg PO    Emergency Department Course:  Nursing notes and vitals reviewed.  1135 I performed an exam of the patient as documented above.  Blood drawn. This was sent to the lab for further testing, results above.  IV inserted. Medicine administered as documented above.     1245 I reevaluated the patient and provided an update in regards to his ED course.      I personally reviewed the laboratory results with the patient and answered all related questions prior to discharge.   Findings and plan explained to the patient. Patient discharged home with instructions regarding supportive care, medications, and reasons to return. The importance of close follow-up was reviewed.     Impression & Plan      Medical Decision Making:   This patient presented to the Emergency Department with nausea, vomiting and diarrhea.  The patient does seem to be dehydrated on presentation and states that he has been out in the heat working a lot recently. The clinical exam today is non-specific and non-focal and non-surgical.  CBC and CMP have returned normal.  Imaging is not indicated given benign abdominal exam. The exact etiology of the nausea is not clear.  The differential diagnosis of nausea is protean and includes:  Bowel Obstruction, Ulcer, Ischemia, Cholecystitis, Diverticulitis, Pancreatitis, UTI, Pyelonephritis, Enteritis/Colitis, amongst many other etiologies.  The sequela of vomiting includes electrolyte abnormalities and dehydration.   Fluids were given and labs were normal.  The history, physical exam, and results detect no life threatening cause at this time, nor do they indicate the patient is currently suffering from one of the previously mentioned conditions.  Unfortunately a clear exam and results today do not ensure freedom from a severe disease process in the future-- even within hours, or the possibility that there is a dangerous process currently at work but currently undetected or undiagnosed.  For this reason the patient is advised to seek immediate re-evaluation in the ED if there is a worsening of the condition, and to be seen by a more consistent care-giver, such as their PMD, if the symptoms persist more than one day.  These instructions were clearly stated and were repeated back to me by a competent patient who agreed to them.     Also discussed high blood pressure readings here and patient states he has a blood pressure cuff at home. He notes his blood pressure has been slowly increasing and I recommended following up with PCP in the next few days for recheck. Discussed risks of long term high blood pressure.     Diagnosis:    ICD-10-CM    1. Nausea, vomiting and diarrhea R11.2     R19.7    2. Elevated blood pressure reading R03.0      Disposition:  Discharged    Discharge Medications:  Discharge Medication List as of 5/30/2018 12:51 PM      START taking these medications    Details   ondansetron (ZOFRAN ODT) 4 MG ODT tab Take 1 tablet (4 mg) by mouth every 8 hours as needed, Disp-10 tablet, R-0, Local Print           Scribe Discloser:  I, Kyle Ni, am serving as a scribe on 5/30/2018 at 11:28 AM to personally document services performed by No att. providers found based on my observations and the provider's statements to me.     5/30/2018   [facility] EMERGENCY DEPARTMENT       Velia Cervantes PA-C  05/30/18 1753

## 2018-05-30 NOTE — ED NOTES
Discussed following up with primary regarding high blood pressure. All questions answered and pt understands discharge teaching

## 2018-05-30 NOTE — LETTER
May 30, 2018      To Whom It May Concern:      Ayan Camacho was seen in our Emergency Department today, 05/30/18.  Please excuse him yesterday and today.     Sincerely,            Angely Cervantes PA-C

## 2019-06-22 ENCOUNTER — HOSPITAL ENCOUNTER (EMERGENCY)
Facility: CLINIC | Age: 34
Discharge: HOME | End: 2019-06-22
Attending: EMERGENCY MEDICINE | Admitting: EMERGENCY MEDICINE
Payer: MEDICAID

## 2019-06-22 VITALS
BODY MASS INDEX: 29.76 KG/M2 | RESPIRATION RATE: 16 BRPM | DIASTOLIC BLOOD PRESSURE: 115 MMHG | TEMPERATURE: 97.5 F | SYSTOLIC BLOOD PRESSURE: 167 MMHG | WEIGHT: 190 LBS | OXYGEN SATURATION: 98 % | HEART RATE: 93 BPM

## 2019-06-22 DIAGNOSIS — K04.7 DENTAL ABSCESS: ICD-10-CM

## 2019-06-22 PROCEDURE — 64400 NJX AA&/STRD TRIGEMINAL NRV: CPT

## 2019-06-22 PROCEDURE — 99283 EMERGENCY DEPT VISIT LOW MDM: CPT

## 2019-06-22 PROCEDURE — 25000132 ZZH RX MED GY IP 250 OP 250 PS 637: Performed by: NURSE PRACTITIONER

## 2019-06-22 RX ORDER — TRAMADOL HYDROCHLORIDE 50 MG/1
50 TABLET ORAL ONCE
Status: COMPLETED | OUTPATIENT
Start: 2019-06-22 | End: 2019-06-22

## 2019-06-22 RX ORDER — TRAMADOL HYDROCHLORIDE 50 MG/1
50 TABLET ORAL EVERY 6 HOURS PRN
Qty: 3 TABLET | Refills: 0 | Status: SHIPPED | OUTPATIENT
Start: 2019-06-22 | End: 2019-06-23

## 2019-06-22 RX ORDER — PENICILLIN V POTASSIUM 500 MG/1
500 TABLET, FILM COATED ORAL 3 TIMES DAILY
Qty: 21 TABLET | Refills: 0 | Status: SHIPPED | OUTPATIENT
Start: 2019-06-22 | End: 2019-06-29

## 2019-06-22 RX ORDER — BUPIVACAINE HYDROCHLORIDE AND EPINEPHRINE 5; 5 MG/ML; UG/ML
INJECTION, SOLUTION PERINEURAL
Status: DISCONTINUED
Start: 2019-06-22 | End: 2019-06-22 | Stop reason: HOSPADM

## 2019-06-22 RX ADMIN — TRAMADOL HYDROCHLORIDE 50 MG: 50 TABLET, FILM COATED ORAL at 10:50

## 2019-06-22 ASSESSMENT — ENCOUNTER SYMPTOMS
VOICE CHANGE: 0
FEVER: 0

## 2019-06-22 NOTE — ED PROVIDER NOTES
History     Chief Complaint:  Dental Pain    HPI   Ayan Camacho is a 34 year old male who presents to the ED for the evaluation of dental pain.  The patient reports that for the past few days he has been experiencing tooth pain, prompting him to the ED. He notes that it is located on his left lower jaw and that he believes he has an abscess there. He states that this is a recurrent issue for him with the same tooth and that he has not seen his dentist yet for this. The patient denies fever and other issues.     Allergies:  Ibuprofen     Medications:    The patient is not currently taking any prescribed medications.     Past Medical History:    The patient does not have any past pertinent medical history.     Past Surgical History:    History reviewed. No pertinent surgical history.     Family History:    History reviewed. No pertinent family history.      Social History:  Smoking status: Current Every Day Smoker, PPD: 0.75  Alcohol use: Yes   Marital Status:  Single [1]     Review of Systems   Constitutional: Negative for fever.   HENT: Positive for dental problem. Negative for voice change.    All other systems reviewed and are negative.      Physical Exam     Patient Vitals for the past 24 hrs:   BP Temp Temp src Pulse Heart Rate Resp SpO2 Weight   06/22/19 0927 (!) 159/124 97.5  F (36.4  C) Temporal 93 93 16 97 % 86.2 kg (190 lb)        Physical Exam  General: Well-nourished, Mild  discomfort  Eyes: PERRL, conjunctivae pink no scleral icterus or conjunctival injection  ENT:  Moist mucus membranes.  Tooth # 30 with swollen gum.  No drainage.  Probable abscess along gumline, tender and swollen.  No cheek or submandibular edema.  No trismus.  Normal voice.  Respiratory:  Normal respiratory effort. No cough  CV: Normal rate   Musculoskeletal: No peripheral edema or calf tenderness  Neuro: Alert and oriented to person/place/time  Skin: Warm and dry. Normal appearance of visualized exposed skin  Psychiatric:  Affect normal. Normal personal interaction. Good eye contact.    Emergency Department Course   Procedures:  PROCEDURE:  Dental Block  LOCATION:  Left lower jaw   ANESTHESIA: Regional block using 0.5% bupivacaine with Epinephrine, total of 4 mLs  PROCEDURE NOTE: The patient tolerated the procedure well with good relief of discomfort and there were no complications. Purulent drainage    Emergency Department Course:  Past medical records, nursing notes, and vitals reviewed.  1022: I performed an exam of the patient and obtained history, as documented above.     1040: I rechecked the patient.  Findings and plan explained to the Patient. Patient discharged home with instructions regarding supportive care, medications, and reasons to return. The importance of close follow-up was reviewed.      Impression & Plan    Medical Decision Making:  Ayan Camacho is a 34 year old male who presented for evaluation of dental pain and facial swelling.  Exam showed dental abscess.  I & D preformed as above. Patient placed on Penicillin. Advised to use Ibuprofen or tylenol for discomfort.  No indication for deeper space infection, PTA, retropharyngeal abscess, facial cellulitis, or Raimundo's angina.  Follow up with dentist in the next 2-3 days or immediately if worsening.  Return to ED if develops difficulty swallowing, high fevers (not controlled with medications), worsening swelling, or for other concerns.    Diagnosis:    ICD-10-CM    1. Dental abscess K04.7        Disposition:  discharged to home    Discharge Medications:     Medication List      Started    penicillin V 500 MG tablet  Commonly known as:  VEETID  500 mg, Oral, 3 TIMES DAILY     traMADol 50 MG tablet  Commonly known as:  ULTRAM  50 mg, Oral, EVERY 6 HOURS PRN          Scribe Disclosure:  Kyle GUTIERREZ, am serving as a scribe at 10:21 AM on 6/22/2019 to document services personally performed by Arnold Carey APRN CNP based on my observations and the  provider's statements to me.      Kyle Mayo  6/22/2019   [facility] EMERGENCY DEPARTMENT       Arnold Carey, EARL CNP  06/22/19 1111

## 2019-06-22 NOTE — ED AVS SNAPSHOT
[facility] Emergency Department  [address]  [facility] [address]  Phone:  [phone]  Fax:  [phone]                                    Ayan Camacho   MRN: [id number]    Department:  [facility] Emergency Department   Date of Visit:  6/22/2019           After Visit Summary Signature Page    I have received my discharge instructions, and my questions have been answered. I have discussed any challenges I see with this plan with the nurse or doctor.    ..........................................................................................................................................  Patient/Patient Representative Signature      ..........................................................................................................................................  Patient Representative Print Name and Relationship to Patient    ..................................................               ................................................  Date                                   Time    ..........................................................................................................................................  Reviewed by Signature/Title    ...................................................              ..............................................  Date                                               Time          22EPIC Rev 08/18

## 2019-06-22 NOTE — DISCHARGE INSTRUCTIONS
Emergency Dental Care  [email or web address]   [address], [place]   Directions   [phone]    [facility]  [email or web address]  Emergency Dental Clinic You Can Rely On. Open 24 Hours. Call Now.  [address], [place]   Directions   [phone]    [facility]  Address: [address], [address] [address]   Phone: [phone]    [facility]  [address] [place], [place] [id number] [phone]  E-mail: [email or web address]  Website      [facility]  ([facility])  [address]  [place], [place]  [id number] [phone]  Website    [facility]  [address] [place], [place] [address] [phone]  Website    [facility]  [address]  [place] [address]  Main: [phone]  Appointments: [phone]  Website    [facility] (Dental cleaning,  deep cleaning - periodontal therapy  and x-rays)  [address] [place], [place] [id number] [phone]    [facility]  [address] [place], [place]  [id number] [phone]  Website    [facility] (Sliding fee scale dental services)  [address] [place] [address]  [phone]    [facility] (Dental cleaning,  deep cleaning- periodontal therapy  and x-rays)  [address] [place] [address]  [phone]    [facility] (Multiple locations -- call for details)  [address] [place], [place] [address] [phone]  Website    [facility]  [address] [place] [address]  [phone]  Website    [facility]  [address] [place] [address]  [phone]  Website    [facility] [place]  [address]  [place] [address] [phone]  Website    [facility]  [address] [place]  [place] [address]  [phone]  Website    [facility]  [address]  [place] [address]  [phone]  Website    [facility]  [address] [place] [address]  [phone]  Website    [facility]  Dr. Nick Leach  [facility]  [address] [place] [address]  [phone]  Website    [facility]  [address] [place] [address]  [phone]  Website    [facility]  [address] [place] [address]  [phone]  Website    [facility] (Dental cleaning,  deep cleaning- periodontal therapy  and x-rays)  [address] [place] [address]  [phone]    [facility]  [address] [place] [address]  [phone]  Website    [facility] (open to everyone)  [address] [address]  [phone]  Website    [facility] (Sliding fee scale dental services and some state medical assistance programs accepted)  [address] [place] [address]  [phone]         [facility]  [address]  [place] [address]  [phone]  Website    [facility]  [address] [place], [place] [address] [phone]  E-mail: [email or web address]  Website     [facility] (Sliding fee scale dental services for all)  [address] [place] [address]  [phone]  Website    [facility] (Dental cleaning,  deep cleaning- periodontal therapy  and x-rays).  [address] [place] [address]  [phone]  Website    [facility]  [address] [place] [address]  [phone]  Website    [facility]  [address] [place]   [address]  [phone]  Website    [facility]  [address] [place] [address]  [phone]  Website    [facility]  [address] [place] [address]  [phone]  Website    [facility]  Dental clinic for children with special needs. Accepts MA (must have diagnosed medical condition, i.e.:  autism, CP, chronic disease or condition)  [address]  [place]  [address]  [phone]  Website    [facility]  [address] [place] [address]  [phone]  Website    [facility]  [address] [place] [address]  [phone]  Website    [facility]  [address] [place]  [address]  [phone]  Website    [facility]  Discount plan available.  Call for details.  [address] [place], [place] [address] [phone]    [facility] (Dental services provided by mobile dental unit)  [facility]  [address] [address]  [phone]    [facility] (Dr. Jared Garcia - specific criteria and medical necessity required)  [facility], 2nd [facility], [address]  [address] [place] [address]  [phone]  Website    [facility]  [address] [place] [address] [place]  [phone] (main clinic)  Website  After Hours: Adult emergencies [phone] Pediatric emergencies [phone]     Dental Center  [address] [place] [address]  [phone]  Website    [facility]  [address] [place] [address]  [phone]  Website    Dental Clinics Accepting MA Clients    [facility]    Child and Teen Checkups  [facility]  [phone]    [facility]  [address]  [place],  [place]  [phone]    [facility]  [address] [place], [place]  [phone]    [facility]  [address] [facility]  [phone]    [facility]  [address] [facility]  [phone]     of  Dental School  [address] [facility]  [phone]    [facility]  [address] [facility]  [phone]    [facility]  [address] [facility]  [phone]    [facility]  [place]  [phone]    [address]  [phone]    [facility]  [address] [facility] [address]  [phone]    [facility]  [address] [place] [address]  [phone]    [facility]  [address] [facility]  [phone]    [facility]  [address] [facility]  [phone]    ADDITIONAL RESOURCES    [facility]  [phone]    [facility]  [phone]    [facility]  [phone]    This web site contains many locations and information about what services they provide:    [email or web address]

## 2019-08-02 ENCOUNTER — HOSPITAL ENCOUNTER (EMERGENCY)
Facility: CLINIC | Age: 34
Discharge: HOME | End: 2019-08-02
Attending: EMERGENCY MEDICINE | Admitting: EMERGENCY MEDICINE
Payer: MEDICAID

## 2019-08-02 VITALS
DIASTOLIC BLOOD PRESSURE: 132 MMHG | TEMPERATURE: 98.5 F | OXYGEN SATURATION: 100 % | SYSTOLIC BLOOD PRESSURE: 163 MMHG | RESPIRATION RATE: 18 BRPM

## 2019-08-02 DIAGNOSIS — K04.7 DENTAL ABSCESS: ICD-10-CM

## 2019-08-02 DIAGNOSIS — R03.0 ELEVATED BLOOD PRESSURE READING WITHOUT DIAGNOSIS OF HYPERTENSION: ICD-10-CM

## 2019-08-02 PROCEDURE — 99283 EMERGENCY DEPT VISIT LOW MDM: CPT | Mod: 25

## 2019-08-02 PROCEDURE — 64400 NJX AA&/STRD TRIGEMINAL NRV: CPT

## 2019-08-02 RX ORDER — BUPIVACAINE HYDROCHLORIDE 5 MG/ML
INJECTION, SOLUTION PERINEURAL
Status: DISCONTINUED
Start: 2019-08-02 | End: 2019-08-02 | Stop reason: HOSPADM

## 2019-08-02 RX ORDER — HYDROCODONE BITARTRATE AND ACETAMINOPHEN 5; 325 MG/1; MG/1
1 TABLET ORAL EVERY 6 HOURS PRN
Qty: 12 TABLET | Refills: 0 | Status: SHIPPED | OUTPATIENT
Start: 2019-08-02 | End: 2019-08-05

## 2019-08-02 ASSESSMENT — ENCOUNTER SYMPTOMS
VOMITING: 0
NAUSEA: 1
FEVER: 0

## 2019-08-02 NOTE — ED TRIAGE NOTES
Patient presents with right-sided dental pain for the past 3 weeks. Patient has a dentist appointment next week. Patient was seen here recently for left side, and put on antibiotics, and now has pain on left side. ABCDs intact, alert and oriented x 4.

## 2019-08-02 NOTE — DISCHARGE INSTRUCTIONS
Discharge Instructions  Dental Pain    You have been seen today for a toothache. Your pain may be caused by an exposed nerve, an infection (pulpitis), a root abscess, or other problems. You will need to see a dentist for a solution to your tooth problem. Emergency Department care is only to help control your problem until you can see a dentist.  Today, we did not find any sign that your toothache was caused by a serious condition, but sometimes symptoms develop over time and cannot be found during an emergency visit, so it is very important that you follow up with your dentist.      Return to the Emergency Department if:  You develop a fever over 101 degrees Fahrenheit  You can t open your mouth normally, can t move your tongue well, or can t swallow  You have new or increased swelling of your face or neck.  You develop drainage of pus or foul smelling material from around your tooth.  What can I do to help myself?  Take any antibiotic the doctor may have prescribed for you today.  Avoid very hot or very cold foods as both can cause pain.  Make an appointment to see a dentist as soon as possible. If you wish, we can provide you with a list of low-cost dental clinics.       Remember that you can always come back to the Emergency Department if you are not able to see your regular doctor in the amount of time listed above, if you get any new symptoms, or if there is anything that worries you.        Dental Resources  Name/Address/Phone Eligibility Hours Fee   [facility]  [address], [address]  [place] [address]  [phone] Anyone Call for appointment [facility] Emergency Department  Private Insurance   [facility]  Hygiene Clinic  [facility] [place], [place] [id number] [phone] Anyone Call for appointment    [facility] refers to low-cost dental clinics for non-preventive care    Upper sorbian Interpreters available Prices start at   Adults        Cleaning $36-$160        X-Ray  $20-40  Children        Cleaning $15        X-ray $10-20        Fluoride $10  Accepts cash, check or credit;  Does not take insurance or MA.   [facility]  [address] [place], [place]  [id number] [phone] Anyone Afternoons and evenings    September-May    Answers phones after 10 AM $30.00 per visit   ($15.00 per visit if 62 or older)   Preventive care.  Restoration care; sliding fee; MA   [facility]  [address] [place], [place] [id number] [phone] Children birth to age 18 and pregnant women    [facility] Residents without insurance will be asked to apply for Assured Care. M TH F 8:30 am - 5 pm  T W 8:30 am - 7 pm    30 locations metro wide    Call for appointment and to confirm hours. Sliding Fee  [facility]  Medical Assistance  Assured Access  Private Insurance    8 Languages Spoken   [facility] [address] [address]  [phone] Anyone Call for appointment Sliding Fee  Accept insurance, [place],   [place] and self-pay.  Call if no insurance.    All services provided.  Staff fluent in Hmong, Laotian, Behzad, Wolof, Sami, Bulgarian, and Farsi.   [facility]  [address] [place] [address]  [phone] Children  Adults in a walk in basis Mon - Fri. 8 - 5 pm       (closed 1-2 pm)  General Dentists & Hygienists  Private Dentists  Dentures Fees based on family size and income ranging from 40% - 100% payment by patient.   [facility]  [address] [place], [place]  [phone] Anyone Mon - Fri 7:30 am - 5:00 pm  By Appt.    Tues & Wed @ 3:00 call for urgent care Appt for next day service Sliding fee:  MA; Insurance   [facility]  [facility]  [address] [place], [place]  [id number] [phone] Anyone Call for an appointment.  Days open vary every semester. Adult cleaning $25  Child cleaning $15  X-Rays $10-$15  Whitening  services available  $75, includes cleaning  Seniors 50% off   [facility]  [address] [place]  [address]  [phone] Anyone M-F 8 am - 5 pm Most insurances accepted.  MA and Sliding Scale.   [facility]  [facility] [place], [place]  [id number] [phone] Anyone without insurance Call to make appt   M-F 9:00 am - 5 pm   (Closed noon hour 12-1)    6 pm- 8 pm Evening hours also available for care Sliding fee based on income and size of household.   [facility]  Dental Clinic  [address] [place]  [address]  [phone] press option 1    For the [facility] press option 4 Anyone              Anyone Monday  4 - 6:30 pm  Tuesday 12:30 - 3 & 4-6:30  Thursday 8:30 - 11 am & 12-2:30 pm  September through May only    All year around on Thursdays from 5-9 pm (only time a dentist is in.) Cleanings & X-Rays Only  Cleanings:  Adults $30                   Kids $20  X-Rays:  Adults $34                Kids $10    MA and Sliding fee   [address] [id number] [phone] Anyone    (Appurifyong interpreters available) M-F 8:00 am - 5:00 pm       By appointment only  (same day appointments available) Sliding fee ($40+ may be due at appointment, remainder billed); MA; Insurance   [facility]  [address] [place], [place] [phone]   Anyone    (Hmong interpreters available) M-Th 8:00 am - 8:00 pm  F 8:00 am - 5:00 pm    By appointment only  (same day appointments available) Sliding fee ($40+ may be due at appointment, remainder billed); MA; Insurance   [facility]  [address] [place], [place]  [phone] Anyone    Must live within [facility] to qualify for sliding fee services Mon, Tues, Thurs, Fri  8:30 am - 5:00 pm  Wed. 8:30 am - 7:00 pm  All other services by appt. only Sliding  Fee; MA   Offer payment plans    Have financial workers that will assist with [place] and will use sliding fee for those who do not qualify.      [facility]  [address] [place] [address]  [phone] Anyone without insurance     Hours and day of week vary  (Call ahead for hours)    Walk-in only Free Services    Cleanings; Fillings; Extractions   [facility]  [address] [place] [address]  [address]  [phone] Anyone Call for an appointment Accept patients with [place] and Medical Assistance.  10% discount if bill is paid in full on day of service.  No sliding fee scale.     [facility]  [address] [place] [address]  [phone] Anyone M-F  8 am-5 pm  Call for appt.    Walk-in hours 8 am - 11am and 1 pm - 5 pm, however take scheduled appointments first    No emergency services or oral surgeries Sliding Fee available with an [place] or [place] denial letter and proof of income.    Accepts Assured Access card and MA coverage.     Name/Address/Phone Eligibility Hours Fee   [facility]  [address] [place]  [address]  [phone] Anyone with emergencies only M & W clinic begins at 6 pm   Call ahead    Alternate Fridays for children by Appt only Free   [facility]  [address] [place], [place] [address] [phone]    Emergencies (adults only):  [phone] Anyone Free walk-in screens for oral surgery    Call ahead for hours    All other services by appt. only  Accepts MA for pediatrics only    Rates are about 25% - 40% less than private dental office.    No sliding fee scale   [facility]  [address] [place]  [address]  [phone] Anyone as long as they do not have health insurance Hours on Mondays, Tuesdays, and Thursdays Sliding fees based on monthly income    No root canals, tooth pulls or emergencies   [facility]  [address]  [facility] [phone] Anyone  M - F 8:00 am - 5:00 pm  Wed 8:00 am - 8 pm Sliding fees; MA; Insurance   [facility]  [address]  [place], [place]  [id number] [phone] Anyone age 55+ M - F 8:00 am - 4:30 pm    Appt. only Set slightly lower fees;  MA; Insurance         Give Kids a smile day in [facility] Children Takes place in February at a few locations     Discharge Instructions  Hypertension - High Blood Pressure    During you visit to the Emergency Department, your blood pressure was higher than the recommended blood pressure.  This may be related to stress, pain, medication or other temporary conditions. In these cases, your blood pressure may return to normal on its own. If you have a history of high blood pressure, you may need to have your provider adjust your medications. Sometimes, your high measurement here may indicate that you have developed high blood pressure that will stay high unless it is treated. As a general rule, high blood pressure causes problems over years rather than days, weeks, or months. So, while it is important to treat blood pressure, it is rarely important to treat blood pressure immediately. Occasionally we will begin a medication in the Emergency Department; more often we will recommend close follow-up for medications with a primary doctor/clinic.    Generally, every Emergency Department visit should have a follow-up clinic visit with either a primary or a specialty clinic/provider. Please follow-up as instructed by your emergency provider today.    Return to the Emergency Department if you start to have:  A severe headache.  Chest pain.  Shortness of breath.  Weakness or numbness that affects one part of the body.  Confusion.  Vision changes.  Significant swelling of legs and/or eyes.  A reaction to any medication started in the Emergency Department.    What can I do to help myself?  Avoid alcohol.  Take any blood pressure medicine  that you are prescribed.  Get a good night s sleep.  Lower your salt intake.  Exercise.  Lose weight.  Manage stress.  See your doctor regularly    If blood pressure medication was started in the Emergency Department:  The medicine may not have an immediate effect. The body and brain determine what blood pressure you have. The medicine s job is to retrain the body s  thermostat  to a lower blood pressure.  You will need to follow up with your provider to see how this medicine is working for you.  If you were given a prescription for medicine here today, be sure to read all of the information (including the package insert) that comes with your prescription.  This will include important information about the medicine, its side effects, and any warnings that you need to know about.  The pharmacist who fills the prescription can provide more information and answer questions you may have about the medicine.  If you have questions or concerns that the pharmacist cannot address, please call or return to the Emergency Department.   Remember that you can always come back to the Emergency Department if you are not able to see your regular provider in the amount of time listed above, if you get any new symptoms, or if there is anything that worries you.

## 2019-08-02 NOTE — ED AVS SNAPSHOT
[facility] Emergency Department  [address]  [facility] [address]  Phone:  [phone]  Fax:  [phone]                                    Ayan Camacho   MRN: [id number]    Department:  [facility] Emergency Department   Date of Visit:  8/2/2019           After Visit Summary Signature Page    I have received my discharge instructions, and my questions have been answered. I have discussed any challenges I see with this plan with the nurse or doctor.    ..........................................................................................................................................  Patient/Patient Representative Signature      ..........................................................................................................................................  Patient Representative Print Name and Relationship to Patient    ..................................................               ................................................  Date                                   Time    ..........................................................................................................................................  Reviewed by Signature/Title    ...................................................              ..............................................  Date                                               Time          22EPIC Rev 08/18

## 2019-08-02 NOTE — ED NOTES
"Patient tolerated numbing injection and I&D of tooth abscess. No active bleeding at this time. Patient given 1 extra sterile gauze for home. Reports pressure \"feels much better\". Reports he has an appointment with a dentist next week. Given other local dentists. MD in to see patient and discuss diagnosis, test results, and discharge plan. MD in to discuss elevated blood pressure and recommendation to get EKG and blood work. Patient declines stating \"I don't have time.\" Discussed reasoning for tests and importance of HTN control. Elevated blood pressure while in ER even after pain controlled from tooth abscess. BP (!) 163/132   Temp 98.5  F (36.9  C) (Oral)   Resp 18   SpO2 100% . Patient reports intention to go to clinic to have HTN evaluated. Patient meets discharge criteria. Discussed AVS with patient. Questions answered. Patient verbalized understanding. Patient reports being ready to go home. Patient discharged home by car with all necessary information.    "

## 2019-08-02 NOTE — ED PROVIDER NOTES
History     Chief Complaint:  Dental Pain    HPI   Ayan Camacho is a 34 year old male who presents to the emergency department for evaluation of right lower jaw dental pain. The patient reports he was seen here on 6/22/19 for similar pain but on his left lower jaw. He reports he started experiencing right lower dental pain and pressure a couple days after he stopped taking the antibiotics from his previous visit and notes the pain has worsened over the last few weeks. The pain is constant and non-radiating. He notes some nausea but denies any fever, vomiting or radiating pain. He notes he does have an appointment with his dentist next week but the pain was too bad to wait.    Allergies:  Ibuprofen     Medications:    The patient is not currently taking any prescribed medications.    Past Medical History:    The patient does not have any past pertinent medical history.    Past Surgical History:    Orthopedic surgery    Family History:    History reviewed. No pertinent family history.     Social History:  Smoking status: Current every day smoker of 0.75 ppd.  Alcohol use: Yes  The patient presents to the emergency department by himself.  Marital Status:  Single [1]     Review of Systems   Constitutional: Negative for fever.   HENT: Positive for dental problem.    Gastrointestinal: Positive for nausea. Negative for vomiting.   All other systems reviewed and are negative.        Physical Exam   Patient Vitals for the past 24 hrs:   BP Temp Temp src Heart Rate Resp SpO2   08/02/19 0924 (!) 163/132 -- -- -- -- --   08/02/19 0820 (!) 163/143 98.5  F (36.9  C) Oral 96 18 100 %     Physical Exam    GEN:    Pleasant, age appropriate.  HEENT:    Tympanic membranes are clear bilateral.      Oropharynx is moist.       No tonsillar erythema without exudate or asymmetric edema.     No deviation of the uvula.     No pooling of secretions, trismus or sublingual edema.     Overall, dentition is por.     Tenderness to right  lower 1st molar with associated dental fracture.      Adjacent gingival edema without fluctuanc  Eyes:    Conjunctiva normal, PERRL  Neck:    Supple, no meningismus.       No pain with manipulation of the hyoid.   CV:     Regular rate and rhythm.     No murmurs, rubs or gallops.    PULM:    Clear to auscultation bilateral.       No respiratory distress.       No stridor.  ABD:    Soft, non-tender, non-distended.      No rebound or guarding.  MSK:     No gross deformity to all four extremities.   LYMPH:   No cervical lymphadenopathy.  NEURO:   Alert.  Normal muscular tone, no atrophy.  Skin:    Warm, dry and intact.    PSYCH:    Mood is good and affect is appropriate.      Emergency Department Course   Procedures:  PROCEDURE NOTE:    PROCEDURE:  Inferior alveolar nerve block on the right side.   INDICATION:  Jaw pain  PHYSICIAN:  Jacobo Stuart MD  DESCRIPTION OF PROCEDURE: Informed verbal consent.  Site correctly identified.  Using dental syringe and 0.5% bupivicaine with epinephrine, the right inferior alveolar nerve was anesthetized using standard technique.  Patient tolerated procedure well, no complications.       Incision and Drainage     LOCATIONS:  Right lower jaw     ANESTHESIA:  Inferior alveolar nerve block using Marcaine 0.5% plain, total of 4 mLs     PROCEDURE:  Area was incised with # 11 Blade (Sharp Point) with a Single Straight incision.  No return of purulent material. Packing consisted of No Packing.  Appropriate dressing was applied to cover the area.    PATIENT STATUS:        Patient tolerated the procedure well. There were no complications.    Emergency Department Course:  Past medical records, nursing notes, and vitals reviewed.  0837: I performed an exam of the patient and obtained history, as documented above.    0845: I performed an nerve block, as stated above.    0913:  I performed the incision and drainage, as stated above.    2037: I rechecked the patient. Findings and plan  explained to the Patient. Patient discharged home with instructions regarding supportive care, medications, and reasons to return. The importance of close follow-up was reviewed.   Impression & Plan    Medical Decision Makin-year-old male presented to the ED with progressively worsening right lower dental pain at his right lower first molar with an area of adjacent edema to the gingiva.  This was concerning for dental abscess.  Patient underwent inferior alveolar nerve block and followed by incision and drainage.  There was no significant purulent material thus likely represents phlegmon as opposed to discrete abscess.  Patient will be discharged home on Augmentin, analgesics and urgent follow up with dentistry.    Patient was also noted to have markedly elevated blood pressure.  I recommend that he have an EKG, screening laboratory studies to evaluate for endorgan damage with renal insufficiency.  I also would recommend initiation of antihypertensives.  Patient declined further investigation and would like to follow-up with primary care physician.  He was made aware that untreated hypertension could lead to irreversible renal injury, stroke, myocardial infarction, permanent neurologic disability and worst-case death.  Patient has capacity to make his own medical decisions and will plan to follow-up with his PCP next week.    Diagnosis:    ICD-10-CM   1. Dental abscess K04.7   2. Elevated blood pressure reading without diagnosis of hypertension R03.0     Disposition:  Discharged to home with instructions for follow up.    Discharge Medications:  Started    amoxicillin-clavulanate 875-125 MG tablet  Commonly known as:  AUGMENTIN  1 tablet, Oral, 2 TIMES DAILY     HYDROcodone-acetaminophen 5-325 MG tablet  Commonly known as:  NORCO  1 tablet, Oral, EVERY 6 HOURS PRN       Rock Pettit  2019   [facility] EMERGENCY DEPARTMENT  Scribe Disclosure:  Rock GUTIERREZ, am serving as a scribe at  8:37 AM on 8/2/2019 to document services personally performed by Jacobo Stuart MD based on my observations and the provider's statements to me.      Jacobo Stuart MD  08/02/19 1424

## 2020-08-13 ENCOUNTER — APPOINTMENT (OUTPATIENT)
Dept: GENERAL RADIOLOGY | Facility: CLINIC | Age: 35
End: 2020-08-13
Attending: EMERGENCY MEDICINE
Payer: COMMERCIAL

## 2020-08-13 ENCOUNTER — HOSPITAL ENCOUNTER (EMERGENCY)
Facility: CLINIC | Age: 35
Discharge: HOME | End: 2020-08-13
Attending: EMERGENCY MEDICINE | Admitting: EMERGENCY MEDICINE
Payer: COMMERCIAL

## 2020-08-13 VITALS
DIASTOLIC BLOOD PRESSURE: 123 MMHG | SYSTOLIC BLOOD PRESSURE: 161 MMHG | OXYGEN SATURATION: 98 % | TEMPERATURE: 97.6 F | HEART RATE: 76 BPM | RESPIRATION RATE: 16 BRPM

## 2020-08-13 DIAGNOSIS — M79.672 ACUTE PAIN OF LEFT FOOT: ICD-10-CM

## 2020-08-13 DIAGNOSIS — M25.572 ACUTE LEFT ANKLE PAIN: ICD-10-CM

## 2020-08-13 PROCEDURE — 73630 X-RAY EXAM OF FOOT: CPT | Mod: LT

## 2020-08-13 PROCEDURE — 99283 EMERGENCY DEPT VISIT LOW MDM: CPT

## 2020-08-13 PROCEDURE — 73610 X-RAY EXAM OF ANKLE: CPT | Mod: LT

## 2020-08-13 ASSESSMENT — ENCOUNTER SYMPTOMS: ARTHRALGIAS: 1

## 2020-08-13 NOTE — ED AVS SNAPSHOT
[facility] Emergency Department  [address]  [facility] [address]  Phone:  [phone]  Fax:  [phone]                                    Ayan Camacho   MRN: [id number]    Department:  [facility] Emergency Department   Date of Visit:  8/13/2020           After Visit Summary Signature Page    I have received my discharge instructions, and my questions have been answered. I have discussed any challenges I see with this plan with the nurse or doctor.    ..........................................................................................................................................  Patient/Patient Representative Signature      ..........................................................................................................................................  Patient Representative Print Name and Relationship to Patient    ..................................................               ................................................  Date                                   Time    ..........................................................................................................................................  Reviewed by Signature/Title    ...................................................              ..............................................  Date                                               Time          22EPIC Rev 08/18

## 2020-08-13 NOTE — LETTER
August 13, 2020      To Whom It May Concern:      Ayan Camacho was seen in our Emergency Department today, 08/13/20.  I expect his condition to improve over the next few days.  Please excuse him from work on 8/12-8/13.    Sincerely,        Toshia Multani MD

## 2020-08-13 NOTE — ED PROVIDER NOTES
History     Chief Complaint:  Ankle Pain  Ankle Pain       HPI  Ayan Camacho is a 35 year old year old male with a history of tobacco abuse who presents for evaluation of ankle pain. The patient states he has rolled his left ankle left in the past 24 hours, yesterday rushing down the stairs and then today getting out of the car. He is able to walk on it somewhat and says he rolled it forward. He denies any other injuries.    Allergies:  Ibuprofen sodium     Medications:   Past medical history reviewed. No pertinent medical history.    Medical History:   Tobacco abuse    Surgical History   Orthopedic surgery    Family History:   Family history reviewed. No pertinent family history.    Social History:  Patient was not accompanied to the ED.   Smoking Status:  Smoker    Type: Cigarettes   Packs/Day: 0.75  Smokeless Tobacco: Never Used   Alcohol Use: Positive  Drug Use: Negative    No Ref-Primary, Physician     Review of Systems   Musculoskeletal: Positive for arthralgias (left ankle).   All other systems reviewed and are negative.    Physical Exam     Patient Vitals for the past 24 hrs:   BP Temp Pulse Heart Rate Resp SpO2   08/13/20 1221 -- 97.6  F (36.4  C) -- -- -- --   08/13/20 1220 (!) 161/123 -- 76 76 16 98 %      Physical Exam    General: Sitting up in wheelchair  Eyes:  Conjunctivae and sclerae are normal  ENT:    Wearing a mask  Neck:  Normal range of motion  CV:  Regular rate     Skin warm and well perfused     DP/PT pulses 2+ on left  Resp:  Non labored breathing on room air    No tachypnea  MS:  Mild swelling on left lateral ankle. Mild tenderness on left lateral ankle, left lateral foot. No tenderness on left prox fibula/left knee, prox tibia  Skin:  No erythema on left foot/ankle  Neuro:   Awake, alert.      Speech is normal and fluent.    Face is symmetric.     Moves all extremities equally    SILT on left foot  Psych: Normal affect.  Appropriate interactions.    Emergency Department Course      Imaging:  Radiology results were communicated with the patient who voiced understanding of the findings.    Foot XR, G/E 3 views, left  No fractures are evident. Normal joint spacing and  alignment. Benign bone islands in the calcaneus, cuboid and fourth  metatarsal head.     SIMRAN GAN MD    XR Ankle Left G/E 3 Views  Mild lateral soft tissue swelling. No fractures are  evident. The ankle mortise is intact. Talar dome is unremarkable.  Benign bone island in the calcaneus.    SIMRAN GAN MD    Reading per radiology     Emergency Department Course:    1232 Nursing notes and vitals reviewed.    1235 I performed an exam of the patient as documented above.     1242 The patient was sent for XR while in the emergency department, results above.     1302 Findings and plan explained to the Patient. Patient discharged home with instructions regarding supportive care, medications, and reasons to return. The importance of close follow-up was reviewed. The patient was prescribed as below.    Impression & Plan     Medical Decision Making:    Ayan Camacho is a 35 year old male who presents for evaluation of ankle pain.  Signs and symptoms are consistent with an ankle/foot sprain. There are no signs of fracture.  The patients neurovascular status is normal. A head to toe trauma exam is otherwise negative; the likelihood of other serious sequelae of trauma (spine, head, chest, abdomen, other extremities, pelvis) is low.  Plan is for protected weightbearing, RICE treatment with ice. Patient will advance weightbearing and follow-up with ortho if not improving. Spoke to him about incidental finding of bone islands.      Diagnosis:     ICD-10-CM    1. Acute left ankle pain  M25.572    2. Acute pain of left foot  M79.672         Disposition:  Discharged to home.    Discharge Medications:  New Prescriptions    No medications on file       Scribe Disclosure:  Juliette GUTIERREZ, am serving as a scribe at 12:35 PM on  8/13/2020 to document services personally performed by Toshia Multani MD based on my observations and the provider's statements to me.      Toshia Multani MD  08/13/20 [id number]

## 2021-04-19 ENCOUNTER — HOSPITAL ENCOUNTER (EMERGENCY)
Facility: CLINIC | Age: 36
Discharge: HOME | End: 2021-04-19
Attending: EMERGENCY MEDICINE | Admitting: EMERGENCY MEDICINE
Payer: COMMERCIAL

## 2021-04-19 ENCOUNTER — APPOINTMENT (OUTPATIENT)
Dept: GENERAL RADIOLOGY | Facility: CLINIC | Age: 36
End: 2021-04-19
Attending: EMERGENCY MEDICINE
Payer: COMMERCIAL

## 2021-04-19 VITALS
TEMPERATURE: 98.1 F | SYSTOLIC BLOOD PRESSURE: 164 MMHG | RESPIRATION RATE: 30 BRPM | OXYGEN SATURATION: 100 % | DIASTOLIC BLOOD PRESSURE: 117 MMHG | HEART RATE: 104 BPM

## 2021-04-19 DIAGNOSIS — R07.9 CHEST PAIN, UNSPECIFIED TYPE: ICD-10-CM

## 2021-04-19 DIAGNOSIS — F10.930 ALCOHOL WITHDRAWAL, UNCOMPLICATED (H): ICD-10-CM

## 2021-04-19 DIAGNOSIS — I10 HYPERTENSION, UNSPECIFIED TYPE: ICD-10-CM

## 2021-04-19 LAB
ANION GAP SERPL CALCULATED.3IONS-SCNC: 8 MMOL/L (ref 3–14)
BASOPHILS # BLD AUTO: 0 10E9/L (ref 0–0.2)
BASOPHILS NFR BLD AUTO: 0.2 %
BUN SERPL-MCNC: 5 MG/DL (ref 7–30)
CALCIUM SERPL-MCNC: 9.5 MG/DL (ref 8.5–10.1)
CHLORIDE SERPL-SCNC: 107 MMOL/L (ref 94–109)
CO2 SERPL-SCNC: 23 MMOL/L (ref 20–32)
CREAT SERPL-MCNC: 0.81 MG/DL (ref 0.66–1.25)
D DIMER PPP FEU-MCNC: <0.3 UG/ML FEU (ref 0–0.5)
DIFFERENTIAL METHOD BLD: ABNORMAL
EOSINOPHIL # BLD AUTO: 0 10E9/L (ref 0–0.7)
EOSINOPHIL NFR BLD AUTO: 0.4 %
ERYTHROCYTE [DISTWIDTH] IN BLOOD BY AUTOMATED COUNT: 12.7 % (ref 10–15)
FLUAV RNA RESP QL NAA+PROBE: NEGATIVE
FLUBV RNA RESP QL NAA+PROBE: NEGATIVE
GFR SERPL CREATININE-BSD FRML MDRD: >90 ML/MIN/{1.73_M2}
GLUCOSE SERPL-MCNC: 141 MG/DL (ref 70–99)
HCT VFR BLD AUTO: 47.2 % (ref 40–53)
HGB BLD-MCNC: 16.7 G/DL (ref 13.3–17.7)
IMM GRANULOCYTES # BLD: 0 10E9/L (ref 0–0.4)
IMM GRANULOCYTES NFR BLD: 0.2 %
INTERPRETATION ECG - MUSE: NORMAL
LABORATORY COMMENT REPORT: NORMAL
LYMPHOCYTES # BLD AUTO: 1.3 10E9/L (ref 0.8–5.3)
LYMPHOCYTES NFR BLD AUTO: 29 %
MCH RBC QN AUTO: 33.9 PG (ref 26.5–33)
MCHC RBC AUTO-ENTMCNC: 35.4 G/DL (ref 31.5–36.5)
MCV RBC AUTO: 96 FL (ref 78–100)
MONOCYTES # BLD AUTO: 0.5 10E9/L (ref 0–1.3)
MONOCYTES NFR BLD AUTO: 11.6 %
NEUTROPHILS # BLD AUTO: 2.6 10E9/L (ref 1.6–8.3)
NEUTROPHILS NFR BLD AUTO: 58.6 %
NRBC # BLD AUTO: 0 10*3/UL
NRBC BLD AUTO-RTO: 0 /100
PLATELET # BLD AUTO: 174 10E9/L (ref 150–450)
POTASSIUM SERPL-SCNC: 3.8 MMOL/L (ref 3.4–5.3)
RBC # BLD AUTO: 4.93 10E12/L (ref 4.4–5.9)
RSV RNA SPEC QL NAA+PROBE: NORMAL
SARS-COV-2 RNA RESP QL NAA+PROBE: NEGATIVE
SODIUM SERPL-SCNC: 138 MMOL/L (ref 133–144)
SPECIMEN SOURCE: NORMAL
TROPONIN I SERPL-MCNC: <0.015 UG/L (ref 0–0.04)
TROPONIN I SERPL-MCNC: <0.015 UG/L (ref 0–0.04)
WBC # BLD AUTO: 4.5 10E9/L (ref 4–11)

## 2021-04-19 PROCEDURE — 84484 ASSAY OF TROPONIN QUANT: CPT | Performed by: EMERGENCY MEDICINE

## 2021-04-19 PROCEDURE — C9803 HOPD COVID-19 SPEC COLLECT: HCPCS

## 2021-04-19 PROCEDURE — 250N000013 HC RX MED GY IP 250 OP 250 PS 637: Performed by: EMERGENCY MEDICINE

## 2021-04-19 PROCEDURE — 96376 TX/PRO/DX INJ SAME DRUG ADON: CPT

## 2021-04-19 PROCEDURE — 93005 ELECTROCARDIOGRAM TRACING: CPT

## 2021-04-19 PROCEDURE — 85025 COMPLETE CBC W/AUTO DIFF WBC: CPT | Performed by: EMERGENCY MEDICINE

## 2021-04-19 PROCEDURE — 250N000009 HC RX 250: Performed by: EMERGENCY MEDICINE

## 2021-04-19 PROCEDURE — 87636 SARSCOV2 & INF A&B AMP PRB: CPT | Performed by: EMERGENCY MEDICINE

## 2021-04-19 PROCEDURE — 96361 HYDRATE IV INFUSION ADD-ON: CPT

## 2021-04-19 PROCEDURE — 96374 THER/PROPH/DIAG INJ IV PUSH: CPT

## 2021-04-19 PROCEDURE — 71045 X-RAY EXAM CHEST 1 VIEW: CPT

## 2021-04-19 PROCEDURE — 258N000003 HC RX IP 258 OP 636: Performed by: EMERGENCY MEDICINE

## 2021-04-19 PROCEDURE — 99284 EMERGENCY DEPT VISIT MOD MDM: CPT | Mod: 25

## 2021-04-19 PROCEDURE — 80048 BASIC METABOLIC PNL TOTAL CA: CPT | Performed by: EMERGENCY MEDICINE

## 2021-04-19 PROCEDURE — 85379 FIBRIN DEGRADATION QUANT: CPT | Performed by: EMERGENCY MEDICINE

## 2021-04-19 PROCEDURE — 250N000011 HC RX IP 250 OP 636: Performed by: EMERGENCY MEDICINE

## 2021-04-19 RX ORDER — LORAZEPAM 2 MG/ML
1 INJECTION INTRAMUSCULAR ONCE
Status: COMPLETED | OUTPATIENT
Start: 2021-04-19 | End: 2021-04-19

## 2021-04-19 RX ORDER — ASPIRIN 325 MG
325 TABLET ORAL ONCE
Status: COMPLETED | OUTPATIENT
Start: 2021-04-19 | End: 2021-04-19

## 2021-04-19 RX ORDER — AMLODIPINE BESYLATE 5 MG/1
5 TABLET ORAL DAILY
Qty: 30 TABLET | Refills: 0 | Status: SHIPPED | OUTPATIENT
Start: 2021-04-19

## 2021-04-19 RX ADMIN — LORAZEPAM 1 MG: 2 INJECTION INTRAMUSCULAR; INTRAVENOUS at 06:54

## 2021-04-19 RX ADMIN — SODIUM CHLORIDE 1000 ML: 9 INJECTION, SOLUTION INTRAVENOUS at 06:24

## 2021-04-19 RX ADMIN — LORAZEPAM 1 MG: 2 INJECTION INTRAMUSCULAR; INTRAVENOUS at 08:10

## 2021-04-19 RX ADMIN — ASPIRIN 325 MG ORAL TABLET 325 MG: 325 PILL ORAL at 06:55

## 2021-04-19 RX ADMIN — LIDOCAINE HYDROCHLORIDE 30 ML: 20 SOLUTION ORAL; TOPICAL at 06:56

## 2021-04-19 ASSESSMENT — ENCOUNTER SYMPTOMS
FEVER: 0
DIAPHORESIS: 1
TREMORS: 1
ROS GI COMMENTS: ACID REFLUX
VOMITING: 0
ABDOMINAL PAIN: 0
DIARRHEA: 0
NAUSEA: 0
COUGH: 0

## 2021-04-19 NOTE — ED PROVIDER NOTES
History   Chief Complaint:  Chest Pain     HPI   Ayan Camacho is a 35 year old male with history of hypertension who presents with chest pain. The patient reports the onset of left shoulder and chest pain/pressure last night, stating it was difficult to sleep. These have been intermittent since, and the patient denies a history of similar symptoms. He notes associated diaphoresis, tremors, and heartburn. He denies any nausea or vomiting and states he has not eaten anything. Denies any trauma to the area. Denies any fever and states his chronic cough is unchanged. No abdominal pain or diarrhea. Denies COVID exposure and states he has not had COVID. No pain/swelling in legs. States he had a couple drinks yesterday afternoon but denies daily alcohol use or history of withdrawal. Denies a history of blood clots.     Review of Systems   Constitutional: Positive for diaphoresis. Negative for fever.   Respiratory: Negative for cough.    Cardiovascular: Positive for chest pain (+ shoulder pain/pressure). Negative for leg swelling.   Gastrointestinal: Negative for abdominal pain, diarrhea, nausea and vomiting.        Acid reflux   Neurological: Positive for tremors.   All other systems reviewed and are negative.    Allergies:  Ibuprofen Sodium    Medications:  The patient is currently on no regular medications.    Past Medical History:    Tobacco abuse   Foreign body of hand  Hypertension      Past Surgical History:    Ortho surgery     Family History:    Cancer     Social History:  Patient presents alone.  Endorses alcohol use.     Physical Exam     Patient Vitals for the past 24 hrs:   BP Temp Temp src Pulse Resp SpO2   04/19/21 0800 (!) 164/112 -- -- 96 15 97 %   04/19/21 0745 (!) 157/117 -- -- 101 14 100 %   04/19/21 0730 (!) 164/105 -- -- 102 28 97 %   04/19/21 0715 (!) 161/122 -- -- 104 18 97 %   04/19/21 0700 (!) 160/123 -- -- 99 22 98 %   04/19/21 0523 (!) 199/124 98.1  F (36.7  C) Oral 133 20 97 %        Physical Exam  General: Patient is awake, alert and interactive when I enter the room  Head: The scalp, face, and head appear normal  Eyes: The pupils are equal, round, and reactive to light. Conjunctivae and sclerae are normal  ENT: External acoustic canals are normal. The oropharynx is normal without erythema. Uvula is in the midline  Neck: Normal range of motion.   CV: tachycardiac but regular   Resp: Lungs are clear without wheezes or rales. No respiratory distress.   GI: Abdomen is soft, no rigidity, guarding, or rebound. No distension. No tenderness to palpation in any quadrant.     MS: Normal tone. Joints grossly normal without effusions. No asymmetric leg swelling, calf or thigh tenderness.    Skin: No rash or lesions noted. Normal capillary refill noted  Neuro: mild hand tremor and tongue fasciculations. Speech is normal and fluent. Face is symmetric. Moving all extremities.   Psych:  Normal affect.  Appropriate interactions.    Emergency Department Course     ECG  ECG taken at 0529, ECG read at 0639  Sinus tachycardia  .  Rate 117 bpm. RI interval 142 ms. QRS duration 78 ms. QT/QTc 320/446 ms. P-R-T axes 58 70 35.     Imaging:    XR Chest Port 1 View:  AP view of the chest was obtained. Cardiomediastinal   silhouette is within normal limits. No suspicious focal pulmonary   opacities. No significant pleural effusion or pneumothorax, as per radiology.     Laboratory:    CBC: WBC: 4.5, HGB: 16.7, PLT: 174  BMP: Glucose 141 (H), Urea Nitrogen: 5 (L), o/w WNL (Creatinine: 0.81)    Troponin (Collected 0624): <0.015  Repeat Troponin (Collected 0837): <0.015  D-dimer: <0.3    Symptomatic Influenza A/B & COVID PCR: Negative    Emergency Department Course:    Reviewed:  I reviewed the patient's nursing notes, vitals, past medical records, Care Everywhere.     Assessments:  0630    I evaluated the patient and performed an exam as above.    0739 I rechecked the patient and discussed results so far.     0925    I  updated the patient on results and discussed plan of care.    Interventions:  0624 NS, 1 L, IV  0654 Ativan, 1 mg, IV  0655 Aspirin, 325 mg, Oral  0656 GI Cocktail, 30 mL, Oral  0810 Ativan, 1 mg, IV    Disposition:  The patient was discharged to home.     Impression & Plan     CMS Diagnoses: None    Medical Decision Making:  Patient is a 35-year-old gentleman with past medical history of hypertension who presents to the emergency department with chest pain.  On initial valuation he is hypertensive and tachycardic but afebrile and oxygenating well on room air.  Clinically he displays some hand tremors, mild diaphoresis and tongue fasciculations.  Along with his tachycardia and hypertension, this appears most consistent with alcohol withdrawal.  Patient was treated with Ativan, fluids and GI cocktail in the emergency department with good improvement of his symptoms.  I believe this is most likely consistent with a episode of mild alcohol withdrawal.  I also consider the possibility of ACS given the characteristics of his initial complaint.  EKG was obtained which did not show any acute signs of ischemia rather just sinus tachycardia.  Troponin x2 was negative.  My suspicion for acute coronary syndrome in this individual is low.  However we discussed reasons to return the emergency department if his chest pain does not significantly improve.  Patient requested medication for hypertension as this is been a longstanding issue for him and is gone untreated.  I suggested that he establish care with a primary care physician however I will start him on a daily amlodipine.    Covid-19  Ayan Camacho was evaluated during a global COVID-19 pandemic, which necessitated consideration that the patient might be at risk for infection with the SARS-CoV-2 virus that causes COVID-19.   Applicable protocols for evaluation were followed during the patient's care.   COVID-19 was considered as part of the patient's evaluation. The plan  for testing is:  a test was obtained during this visit.    Diagnosis:    ICD-10-CM    1. Chest pain, unspecified type  R07.9    2. Alcohol withdrawal, uncomplicated (H)  F10.230    3. Hypertension, unspecified type  I10        Discharge Medications:  New Prescriptions    AMLODIPINE (NORVASC) 5 MG TABLET    Take 1 tablet (5 mg) by mouth daily       Scribe Disclosure:  Destiney GUTIERREZ, am serving as a scribe at 6:07 AM on 4/19/2021 to document services personally performed by Arvin Overton MD based on my observations and the provider's statements to me.      Arvin Overton MD  04/19/21 [id number]

## 2023-10-03 ENCOUNTER — HOSPITAL ENCOUNTER (EMERGENCY)
Facility: CLINIC | Age: 38
Discharge: HOME | End: 2023-10-03
Attending: EMERGENCY MEDICINE | Admitting: EMERGENCY MEDICINE
Payer: COMMERCIAL

## 2023-10-03 ENCOUNTER — APPOINTMENT (OUTPATIENT)
Dept: GENERAL RADIOLOGY | Facility: CLINIC | Age: 38
End: 2023-10-03
Attending: EMERGENCY MEDICINE
Payer: COMMERCIAL

## 2023-10-03 VITALS
RESPIRATION RATE: 20 BRPM | DIASTOLIC BLOOD PRESSURE: 69 MMHG | HEART RATE: 99 BPM | TEMPERATURE: 97.8 F | SYSTOLIC BLOOD PRESSURE: 135 MMHG | OXYGEN SATURATION: 100 %

## 2023-10-03 DIAGNOSIS — R03.0 ELEVATED BLOOD PRESSURE READING: ICD-10-CM

## 2023-10-03 DIAGNOSIS — R07.9 CHEST PAIN, UNSPECIFIED TYPE: ICD-10-CM

## 2023-10-03 LAB
ANION GAP SERPL CALCULATED.3IONS-SCNC: 16 MMOL/L (ref 7–15)
ATRIAL RATE - MUSE: 106 BPM
BASO+EOS+MONOS # BLD AUTO: NORMAL 10*3/UL
BASO+EOS+MONOS NFR BLD AUTO: NORMAL %
BASOPHILS # BLD AUTO: 0 10E3/UL (ref 0–0.2)
BASOPHILS NFR BLD AUTO: 0 %
BUN SERPL-MCNC: 18.5 MG/DL (ref 6–20)
CALCIUM SERPL-MCNC: 9.9 MG/DL (ref 8.6–10)
CHLORIDE SERPL-SCNC: 98 MMOL/L (ref 98–107)
CREAT SERPL-MCNC: 0.89 MG/DL (ref 0.67–1.17)
D DIMER PPP FEU-MCNC: <0.27 UG/ML FEU (ref 0–0.5)
DEPRECATED HCO3 PLAS-SCNC: 21 MMOL/L (ref 22–29)
DIASTOLIC BLOOD PRESSURE - MUSE: NORMAL MMHG
EGFRCR SERPLBLD CKD-EPI 2021: >90 ML/MIN/1.73M2
EOSINOPHIL # BLD AUTO: 0 10E3/UL (ref 0–0.7)
EOSINOPHIL NFR BLD AUTO: 0 %
ERYTHROCYTE [DISTWIDTH] IN BLOOD BY AUTOMATED COUNT: 12.2 % (ref 10–15)
GLUCOSE SERPL-MCNC: 137 MG/DL (ref 70–99)
HBA1C MFR BLD: 5.2 %
HCT VFR BLD AUTO: 47.2 % (ref 40–53)
HGB BLD-MCNC: 16 G/DL (ref 13.3–17.7)
HOLD SPECIMEN: NORMAL
IMM GRANULOCYTES # BLD: 0 10E3/UL
IMM GRANULOCYTES NFR BLD: 0 %
INTERPRETATION ECG - MUSE: NORMAL
LYMPHOCYTES # BLD AUTO: 1.3 10E3/UL (ref 0.8–5.3)
LYMPHOCYTES NFR BLD AUTO: 16 %
MCH RBC QN AUTO: 32.9 PG (ref 26.5–33)
MCHC RBC AUTO-ENTMCNC: 33.9 G/DL (ref 31.5–36.5)
MCV RBC AUTO: 97 FL (ref 78–100)
MONOCYTES # BLD AUTO: 0.6 10E3/UL (ref 0–1.3)
MONOCYTES NFR BLD AUTO: 7 %
NEUTROPHILS # BLD AUTO: 6.5 10E3/UL (ref 1.6–8.3)
NEUTROPHILS NFR BLD AUTO: 77 %
NRBC # BLD AUTO: 0 10E3/UL
NRBC BLD AUTO-RTO: 0 /100
P AXIS - MUSE: 58 DEGREES
PLATELET # BLD AUTO: 184 10E3/UL (ref 150–450)
POTASSIUM SERPL-SCNC: 3.9 MMOL/L (ref 3.4–5.3)
PR INTERVAL - MUSE: 162 MS
QRS DURATION - MUSE: 78 MS
QT - MUSE: 334 MS
QTC - MUSE: 443 MS
R AXIS - MUSE: 68 DEGREES
RBC # BLD AUTO: 4.86 10E6/UL (ref 4.4–5.9)
SARS-COV-2 RNA RESP QL NAA+PROBE: NEGATIVE
SODIUM SERPL-SCNC: 135 MMOL/L (ref 135–145)
SYSTOLIC BLOOD PRESSURE - MUSE: NORMAL MMHG
T AXIS - MUSE: 39 DEGREES
TROPONIN T SERPL HS-MCNC: <6 NG/L
VENTRICULAR RATE- MUSE: 106 BPM
WBC # BLD AUTO: 8.4 10E3/UL (ref 4–11)

## 2023-10-03 PROCEDURE — 36415 COLL VENOUS BLD VENIPUNCTURE: CPT | Performed by: EMERGENCY MEDICINE

## 2023-10-03 PROCEDURE — 93005 ELECTROCARDIOGRAM TRACING: CPT

## 2023-10-03 PROCEDURE — 96360 HYDRATION IV INFUSION INIT: CPT

## 2023-10-03 PROCEDURE — 84484 ASSAY OF TROPONIN QUANT: CPT | Performed by: EMERGENCY MEDICINE

## 2023-10-03 PROCEDURE — 85025 COMPLETE CBC W/AUTO DIFF WBC: CPT | Performed by: EMERGENCY MEDICINE

## 2023-10-03 PROCEDURE — 250N000013 HC RX MED GY IP 250 OP 250 PS 637: Performed by: EMERGENCY MEDICINE

## 2023-10-03 PROCEDURE — 87635 SARS-COV-2 COVID-19 AMP PRB: CPT | Performed by: EMERGENCY MEDICINE

## 2023-10-03 PROCEDURE — 71046 X-RAY EXAM CHEST 2 VIEWS: CPT

## 2023-10-03 PROCEDURE — 85379 FIBRIN DEGRADATION QUANT: CPT | Performed by: EMERGENCY MEDICINE

## 2023-10-03 PROCEDURE — 80048 BASIC METABOLIC PNL TOTAL CA: CPT | Performed by: EMERGENCY MEDICINE

## 2023-10-03 PROCEDURE — 99285 EMERGENCY DEPT VISIT HI MDM: CPT | Mod: 25

## 2023-10-03 PROCEDURE — 258N000003 HC RX IP 258 OP 636: Performed by: EMERGENCY MEDICINE

## 2023-10-03 PROCEDURE — 83036 HEMOGLOBIN GLYCOSYLATED A1C: CPT | Performed by: EMERGENCY MEDICINE

## 2023-10-03 RX ORDER — ACETAMINOPHEN 500 MG
1000 TABLET ORAL EVERY 4 HOURS PRN
Status: DISCONTINUED | OUTPATIENT
Start: 2023-10-03 | End: 2023-10-03 | Stop reason: HOSPADM

## 2023-10-03 RX ORDER — METHOCARBAMOL 500 MG/1
500 TABLET, FILM COATED ORAL 4 TIMES DAILY PRN
Qty: 12 TABLET | Refills: 0 | Status: SHIPPED | OUTPATIENT
Start: 2023-10-03 | End: 2023-10-06

## 2023-10-03 RX ADMIN — SODIUM CHLORIDE 1000 ML: 9 INJECTION, SOLUTION INTRAVENOUS at 14:40

## 2023-10-03 RX ADMIN — ACETAMINOPHEN 1000 MG: 500 TABLET, FILM COATED ORAL at 14:15

## 2023-10-03 ASSESSMENT — ACTIVITIES OF DAILY LIVING (ADL)
ADLS_ACUITY_SCORE: 33
ADLS_ACUITY_SCORE: 35

## 2023-10-03 NOTE — ED TRIAGE NOTES
Patient comes in with c/o left sided CP. Patient woke up and noticed the pain. Patient took 1 full strength ASA and felt some relief. Tingling down his left arm and HA. Hasn't taken his HTN medication x1 year because it 'made me feel funny'.

## 2023-10-03 NOTE — DISCHARGE INSTRUCTIONS
Please return to the emergency department if your symptoms increase, you experience an increase in pain, shortness of breath.    You can use Tylenol for pain.  You can use a lidocaine patch for pain  You can use Robaxin, this is a muscle relaxer.  This will make you drowsy, please do not drive while taking this medication.    Please follow-up with your primary care provider.  It looks like you need to be on blood pressure medicine, and there are others that you can try if you did not like the first 1.  Please discuss this with your doctor.

## 2023-10-03 NOTE — ED PROVIDER NOTES
"  History     Chief Complaint:  Chest Pain     HPI   Ayan Camacho is a 38 year old male with history of HTN who presents to the ED with severe left-sided chest pain beginning today. Patient states that the pain is constant. It radiates to his left side as well. He endorses worsened pain with deep breaths. He has never felt this pain before and wonders if this may be related to a torn muscle, as he works outside frequently for his job. He also has a tingling sensation down his left arm. Denies recent fevers, cough, and cold symptoms. Patient is not anticoagulated. No history of blood clots. Patient took a full dose Aspirin prior to arrival which temporarily relieved the pain, but it eventually returned. He did not take an Tylenol or Motrin today. Of note, patient has not taken his prescribed amlodipine for 1 year because it makes his head feel \"foggy.\" He does have a PCP he can follow up with.     Independent Historian:   None - Patient Only    Review of External Notes:       Medications:    Norvasc    Past Medical History:    Anxiety   HTN  Obesity   Tobacco abuse     Past Surgical History:    Orthopedic surgery      Physical Exam   Patient Vitals for the past 24 hrs:   BP Temp Temp src Pulse Resp SpO2   10/03/23 1104 (!) 167/110 97.8  F (36.6  C) Temporal 112 22 100 %        Physical Exam  General: Well-nourished, resting comfortably when I enter the room  Eyes: Pupils equal, conjunctivae pink no scleral icterus or conjunctival injection  ENT:  Moist mucus membranes  Respiratory:  Lungs clear to auscultation bilaterally, no crackles/rubs/wheezes.  Good air movement  CV: Normal rate and rhythm, no murmurs.  Tenderness to palpation of the left chest wall.  GI:  Abdomen soft and non-distended.  No tenderness, guarding or rebound  Skin: Warm, dry.  No rashes or petechiae  Musculoskeletal: No peripheral edema or calf tenderness  Neuro: Alert and oriented to person/place/time  Psychiatric: Normal affect    Emergency " Department Course   ECG  ECG results from 10/03/23   EKG 12-lead, tracing only     Value    Systolic Blood Pressure     Diastolic Blood Pressure     Ventricular Rate 106    Atrial Rate 106    OH Interval 162    QRS Duration 78        QTc 443    P Axis 58    R AXIS 68    T Axis 39    Interpretation ECG      Sinus tachycardia  Nonspecific T wave abnormality  Abnormal ECG  When compared with ECG of 19-APR-2021 05:29,  Nonspecific T wave abnormality now evident in Lateral leads         Imaging:  XR Chest 2 Views   Final Result   IMPRESSION: No focal consolidation, pleural effusion or pneumothorax.   Cardiomediastinal silhouette is unremarkable. Minimal degenerative   changes of the spine.      YOVANA BOO MD            SYSTEM ID:  [id number]         Report per radiology    Laboratory:  Labs Ordered and Resulted from Time of ED Arrival to Time of ED Departure   BASIC METABOLIC PANEL - Abnormal       Result Value    Sodium 135      Potassium 3.9      Chloride 98      Carbon Dioxide (CO2) 21 (*)     Anion Gap 16 (*)     Urea Nitrogen 18.5      Creatinine 0.89      GFR Estimate >90      Calcium 9.9      Glucose 137 (*)    TROPONIN T, HIGH SENSITIVITY - Normal    Troponin T, High Sensitivity <6     HEMOGLOBIN A1C - Normal    Hemoglobin A1C 5.2     D DIMER QUANTITATIVE - Normal    D-Dimer Quantitative <0.27     COVID-19 VIRUS (CORONAVIRUS) BY PCR - Normal    SARS CoV2 PCR Negative     CBC WITH PLATELETS AND DIFFERENTIAL    WBC Count 8.4      RBC Count 4.86      Hemoglobin 16.0      Hematocrit 47.2      MCV 97      MCH 32.9      MCHC 33.9      RDW 12.2      Platelet Count 184      % Neutrophils 77      % Lymphocytes 16      % Monocytes 7      Mids % (Monos, Eos, Basos)        % Eosinophils 0      % Basophils 0      % Immature Granulocytes 0      NRBCs per 100 WBC 0      Absolute Neutrophils 6.5      Absolute Lymphocytes 1.3      Absolute Monocytes 0.6      Mids Abs (Monos, Eos, Basos)        Absolute Eosinophils  0.0      Absolute Basophils 0.0      Absolute Immature Granulocytes 0.0      Absolute NRBCs 0.0     ROUTINE UA WITH MICROSCOPIC REFLEX TO CULTURE       Emergency Department Course & Assessments:       Interventions:  Medications   acetaminophen (TYLENOL) tablet 1,000 mg (1,000 mg Oral $Given 10/3/23 1415)   sodium chloride 0.9% BOLUS 1,000 mL (0 mLs Intravenous Stopped 10/3/23 1608)        Assessments:  1405 I obtained history and examined the patient as noted above.  1516 I rechecked the patient and explained findings.    Independent Interpretation (X-rays, CTs, rhythm strip):  Chest x-ray does not show any consolidations, pleural effusion, pneumothorax.    Social Determinants of Health affecting care:   None    Disposition:  The patient was discharged to home.     Impression & Plan    CMS Diagnoses: None    Medical Decision Makin-year-old male presents emergency department with a complaint of left-sided chest pain.  Differential includes but is not limited to MI, PE, pneumonia, musculoskeletal.  Patient does have a history of hypertension, and is not taking his medications.  He supposed to be on amlodipine.  He states that it makes him feel foggy so he does not take it.  Troponin is negative, EKG does not show any signs of ischemia.  D-dimer is negative, I do not think that he has a PE.  His chest x-ray does not show any consolidation, pleural effusion, pneumothorax. He has not had any infectious symptoms, I have low suspicion of pericarditis or myocarditis. There is tenderness to palpation when I press on his chest.  This may be musculoskeletal problem.  He is given Tylenol which has helped his symptoms.  Patient is given a muscle relaxer for home.  I did advise him to follow-up with his primary care provider to discuss medication for hypertension.  On reevaluation prior to discharge his blood pressure is 135/69.      Diagnosis:    ICD-10-CM    1. Chest pain, unspecified type  R07.9       2. Elevated blood  pressure reading  R03.0            Discharge Medications:  New Prescriptions    METHOCARBAMOL (ROBAXIN) 500 MG TABLET    Take 1 tablet (500 mg) by mouth 4 times daily as needed for muscle spasms        Scribe Disclosure:  I, Khushbu Stein, am serving as a scribe at 2:13 PM on 10/3/2023 to document services personally performed by Juliette Massey MD based on my observations and the provider's statements to me.   10/3/2023   Juliette Massey MD Richardson, Elizabeth, MD  10/03/23 [id number]

## 2023-10-03 NOTE — LETTER
October 3, 2023      To Whom It May Concern:      Ayan Camacho was seen in our Emergency Department today, 10/03/23.  I expect his condition to improve over the next day.  He may return to work/school when improved.    Sincerely,        Vanda ZAMBRANO RN

## 2025-07-23 ENCOUNTER — APPOINTMENT (OUTPATIENT)
Dept: CT IMAGING | Facility: CLINIC | Age: 40
End: 2025-07-23
Attending: EMERGENCY MEDICINE

## 2025-07-23 ENCOUNTER — HOSPITAL ENCOUNTER (EMERGENCY)
Facility: CLINIC | Age: 40
Discharge: HOME | End: 2025-07-23
Attending: EMERGENCY MEDICINE

## 2025-07-23 VITALS
WEIGHT: 196.87 LBS | RESPIRATION RATE: 14 BRPM | BODY MASS INDEX: 29.16 KG/M2 | OXYGEN SATURATION: 98 % | SYSTOLIC BLOOD PRESSURE: 161 MMHG | DIASTOLIC BLOOD PRESSURE: 111 MMHG | HEIGHT: 69 IN | HEART RATE: 82 BPM | TEMPERATURE: 97.3 F

## 2025-07-23 DIAGNOSIS — R51.9 HEADACHE, UNSPECIFIED HEADACHE TYPE: Primary | ICD-10-CM

## 2025-07-23 LAB
ANION GAP SERPL CALCULATED.3IONS-SCNC: 15 MMOL/L (ref 7–15)
BASOPHILS # BLD AUTO: 0 10E3/UL (ref 0–0.2)
BASOPHILS NFR BLD AUTO: 0 %
BUN SERPL-MCNC: 19.8 MG/DL (ref 6–20)
CALCIUM SERPL-MCNC: 9.7 MG/DL (ref 8.8–10.4)
CHLORIDE SERPL-SCNC: 96 MMOL/L (ref 98–107)
CREAT SERPL-MCNC: 1.07 MG/DL (ref 0.67–1.17)
EGFRCR SERPLBLD CKD-EPI 2021: 90 ML/MIN/1.73M2
EOSINOPHIL # BLD AUTO: 0.1 10E3/UL (ref 0–0.7)
EOSINOPHIL NFR BLD AUTO: 2 %
ERYTHROCYTE [DISTWIDTH] IN BLOOD BY AUTOMATED COUNT: 13 % (ref 10–15)
GLUCOSE SERPL-MCNC: 152 MG/DL (ref 70–99)
HCO3 SERPL-SCNC: 21 MMOL/L (ref 22–29)
HCT VFR BLD AUTO: 48.6 % (ref 40–53)
HGB BLD-MCNC: 17.2 G/DL (ref 13.3–17.7)
IMM GRANULOCYTES # BLD: 0 10E3/UL
IMM GRANULOCYTES NFR BLD: 0 %
LYMPHOCYTES # BLD AUTO: 1.6 10E3/UL (ref 0.8–5.3)
LYMPHOCYTES NFR BLD AUTO: 36 %
MAGNESIUM SERPL-MCNC: 2.1 MG/DL (ref 1.7–2.3)
MCH RBC QN AUTO: 32 PG (ref 26.5–33)
MCHC RBC AUTO-ENTMCNC: 35.4 G/DL (ref 31.5–36.5)
MCV RBC AUTO: 91 FL (ref 78–100)
MONOCYTES # BLD AUTO: 0.5 10E3/UL (ref 0–1.3)
MONOCYTES NFR BLD AUTO: 11 %
NEUTROPHILS # BLD AUTO: 2.3 10E3/UL (ref 1.6–8.3)
NEUTROPHILS NFR BLD AUTO: 52 %
NRBC # BLD AUTO: 0 10E3/UL
NRBC BLD AUTO-RTO: 0 /100
PLATELET # BLD AUTO: 216 10E3/UL (ref 150–450)
POTASSIUM SERPL-SCNC: 4.3 MMOL/L (ref 3.4–5.3)
RBC # BLD AUTO: 5.37 10E6/UL (ref 4.4–5.9)
SODIUM SERPL-SCNC: 132 MMOL/L (ref 135–145)
TSH SERPL DL<=0.005 MIU/L-ACNC: 0.97 UIU/ML (ref 0.3–4.2)
WBC # BLD AUTO: 4.4 10E3/UL (ref 4–11)

## 2025-07-23 PROCEDURE — 36415 COLL VENOUS BLD VENIPUNCTURE: CPT | Performed by: EMERGENCY MEDICINE

## 2025-07-23 PROCEDURE — 250N000013 HC RX MED GY IP 250 OP 250 PS 637: Performed by: EMERGENCY MEDICINE

## 2025-07-23 PROCEDURE — 258N000003 HC RX IP 258 OP 636: Performed by: EMERGENCY MEDICINE

## 2025-07-23 PROCEDURE — 85004 AUTOMATED DIFF WBC COUNT: CPT | Performed by: EMERGENCY MEDICINE

## 2025-07-23 PROCEDURE — 99285 EMERGENCY DEPT VISIT HI MDM: CPT | Mod: 25 | Performed by: EMERGENCY MEDICINE

## 2025-07-23 PROCEDURE — 250N000011 HC RX IP 250 OP 636: Performed by: EMERGENCY MEDICINE

## 2025-07-23 PROCEDURE — 84443 ASSAY THYROID STIM HORMONE: CPT | Performed by: EMERGENCY MEDICINE

## 2025-07-23 PROCEDURE — 83735 ASSAY OF MAGNESIUM: CPT | Performed by: EMERGENCY MEDICINE

## 2025-07-23 PROCEDURE — 70450 CT HEAD/BRAIN W/O DYE: CPT

## 2025-07-23 PROCEDURE — 80048 BASIC METABOLIC PNL TOTAL CA: CPT | Performed by: EMERGENCY MEDICINE

## 2025-07-23 RX ORDER — ACETAMINOPHEN 325 MG/1
975 TABLET ORAL ONCE
Status: COMPLETED | OUTPATIENT
Start: 2025-07-23 | End: 2025-07-23

## 2025-07-23 RX ORDER — DIPHENHYDRAMINE HYDROCHLORIDE 50 MG/ML
25 INJECTION, SOLUTION INTRAMUSCULAR; INTRAVENOUS ONCE
Status: COMPLETED | OUTPATIENT
Start: 2025-07-23 | End: 2025-07-23

## 2025-07-23 RX ADMIN — PROCHLORPERAZINE EDISYLATE 10 MG: 5 INJECTION INTRAMUSCULAR; INTRAVENOUS at 09:35

## 2025-07-23 RX ADMIN — SODIUM CHLORIDE 1000 ML: 9 INJECTION, SOLUTION INTRAVENOUS at 09:39

## 2025-07-23 RX ADMIN — DIPHENHYDRAMINE HYDROCHLORIDE 25 MG: 50 INJECTION, SOLUTION INTRAMUSCULAR; INTRAVENOUS at 09:39

## 2025-07-23 RX ADMIN — ACETAMINOPHEN 975 MG: 325 TABLET ORAL at 09:35

## 2025-07-23 ASSESSMENT — ACTIVITIES OF DAILY LIVING (ADL): ADLS_ACUITY_SCORE: 41

## 2025-07-23 ASSESSMENT — COLUMBIA-SUICIDE SEVERITY RATING SCALE - C-SSRS
1. IN THE PAST MONTH, HAVE YOU WISHED YOU WERE DEAD OR WISHED YOU COULD GO TO SLEEP AND NOT WAKE UP?: NO
2. HAVE YOU ACTUALLY HAD ANY THOUGHTS OF KILLING YOURSELF IN THE PAST MONTH?: NO
6. HAVE YOU EVER DONE ANYTHING, STARTED TO DO ANYTHING, OR PREPARED TO DO ANYTHING TO END YOUR LIFE?: NO

## 2025-07-23 NOTE — ED TRIAGE NOTES
Pt comes in with  headache on the right side of his head for the past week.  He states that he has had some dizziness associated with it but not now.  He denies hx of migraines.      Triage Assessment (Adult)       Row Name 07/23/25 0905          Triage Assessment    Airway WDL WDL        Respiratory WDL    Respiratory WDL WDL        Cardiac WDL    Cardiac WDL WDL

## 2025-07-23 NOTE — Clinical Note
Ayan Camacho was seen and treated in our emergency department on 7/23/2025.  He may return to work on 07/24/2025.       If you have any questions or concerns, please don't hesitate to call.      Juliette Massey MD

## 2025-07-23 NOTE — ED PROVIDER NOTES
"  Emergency Department Note      History of Present Illness     Chief Complaint   Headache    HPI   Ayan Camacho is a 40 year old male with a history of hypertension presenting with a headache that radiates down his neck that onset last week. He describes the pain as constantly a 7/10, but will get as high as a 9/10. The onset of symptoms was gradual. He has tried taking Aleve to manage his pain with no relief. He has felt lightheaded at work a couple times. The patient denies fever, cough, slurred speech, facial droop, numbness or weakness. No recent trauma. No history of similar symptoms. Of note, the patient reports a family history of brain aneurysms.    Independent Historian   None    Review of External Notes   Reviewed patient's chart, no history of migraines or headaches.    Past Medical History     Medical History and Problem List   Prediabetes   Alcohol use disorder   Hypertension   Tobacco use   Anxiety     Medications   Amlodipine   Lisinopril   Naltrexone   Albuterol   Atorvastatin     Surgical History   Orthopedic surgery     Physical Exam     Patient Vitals for the past 24 hrs:   BP Temp Temp src Pulse Resp SpO2 Height Weight   07/23/25 0906 (!) 184/118 97.3  F (36.3  C) Temporal 107 18 97 % 1.753 m (5' 9\") 89.3 kg (196 lb 13.9 oz)     Physical Exam  Gen: No distress.  Nontoxic.  Head: Atraumatic.  No hematomas, lesions, abrasions  Neck: No midline tenderness of the spine. No meningeal signs.  Mouth: No oral lesions, or abrasions.  Mucous membranes are moist.  Resp: Equal breath sounds, normal respiratory effort  Cardio: Regular rate and rhythm, no murmurs  Abdomen: Soft, nontender, nondistended  MSK; no extremity swelling, bruising, or abrasions.  Neuro:   Neuro Exam:  Mental status  Alertness: Alert  Orientation: Oriented to self, place, and time  Language: normal naming, normal fluency, no dysarthria, normal repetition, and normal recall  Cranial nerves  CN II: acuity grossly intact, visual " fields intact, and direct pupillary light response normal  CN III/IV/VI: EOMI and consensual pupillary light reflex normal  CN V: facial sensation intact to light touch throughout  CN VII: face symmetric  CN VIII: finger rub normal  CN IX/X: palate & uvula symmetric  CN XI: equal shoulder shrug  CN XII: tongue midline  Motor  no drift and normal strength in all four extremities  Sensory  intact sensation to light touch distally in all 4 extremities and face  Coordination  Gait is normal  finger-nose-finger normal and heel-shin normal    Psych: Appropriate affect.      Diagnostics     Lab Results   Labs Ordered and Resulted from Time of ED Arrival to Time of ED Departure   BASIC METABOLIC PANEL (LIMITED OCCURRENCES) - Abnormal       Result Value    Sodium 132 (*)     Potassium 4.3      Chloride 96 (*)     Carbon Dioxide (CO2) 21 (*)     Anion Gap 15      Urea Nitrogen 19.8      Creatinine 1.07      GFR Estimate 90      Calcium 9.7      Glucose 152 (*)    MAGNESIUM (LIMITED OCCURRENCES) - Normal    Magnesium 2.1     TSH WITH FREE T4 REFLEX - Normal    TSH 0.97     CBC WITH PLATELETS AND DIFFERENTIAL    WBC Count 4.4      RBC Count 5.37      Hemoglobin 17.2      Hematocrit 48.6      MCV 91      MCH 32.0      MCHC 35.4      RDW 13.0      Platelet Count 216      % Neutrophils 52      % Lymphocytes 36      % Monocytes 11      % Eosinophils 2      % Basophils 0      % Immature Granulocytes 0      NRBCs per 100 WBC 0      Absolute Neutrophils 2.3      Absolute Lymphocytes 1.6      Absolute Monocytes 0.5      Absolute Eosinophils 0.1      Absolute Basophils 0.0      Absolute Immature Granulocytes 0.0      Absolute NRBCs 0.0       Imaging   Head CT w/o contrast   Final Result   IMPRESSION:   1.  No CT evidence for an acute intracranial process.           Independent Interpretation   CT Head: No intracranial hemorrhage or midline shift.    ED Course      Medications Administered   Medications   sodium chloride 0.9% BOLUS 1,000  mL (1,000 mLs Intravenous $New Bag 7/23/25 0939)   acetaminophen (TYLENOL) tablet 975 mg (975 mg Oral $Given 7/23/25 0935)   prochlorperazine (COMPAZINE) injection 10 mg (10 mg Intravenous $Given 7/23/25 0935)   diphenhydrAMINE (BENADRYL) injection 25 mg (25 mg Intravenous $Given 7/23/25 0939)     Procedures   Procedures     Discussion of Management   None    ED Course   ED Course as of 07/23/25 1034   Wed Jul 23, 2025   0915 I obtained the history and examined the patient as noted above.      1031 I rechecked and updated the patient.      1031 I discussed discharge instructions, patient is comfortable with discharge.         Additional Documentation  None    Medical Decision Making / Diagnosis     CMS Diagnoses: None    MIPS   None    [facility]   Ayan Camacho is a 40 year old male presents emergency department with a complaint of a headache.  On exam, patient is well-appearing.  No focal neurologic deficits.  No signs of meningitis.  His headache has been gradual in onset and not the worst headache of his life.  Low suspicion for subarachnoid hemorrhage.  Lab work is all reassuring.  Head CT does not show any acute findings.  Patient is treated with fluids, Compazine, Benadryl, Tylenol.  On reevaluation, patient is slightly improved.  He would like to be discharged.  He will take Tylenol at home.  He is feeling reassured.  Patient is discharged home.    Disposition   The patient was discharged.     Diagnosis     ICD-10-CM    1. Headache, unspecified headache type  R51.9          Discharge Medications   New Prescriptions    No medications on file     Scribe Disclosure:  Juliette GUTIERREZ, am serving as a scribe at 9:30 AM on 7/23/2025 to document services personally performed by Juliette Massey MD based on my observations and the provider's statements to me.        Juliette Massey MD  07/23/25 [id number]